# Patient Record
Sex: FEMALE | Race: WHITE | NOT HISPANIC OR LATINO | Employment: FULL TIME | URBAN - METROPOLITAN AREA
[De-identification: names, ages, dates, MRNs, and addresses within clinical notes are randomized per-mention and may not be internally consistent; named-entity substitution may affect disease eponyms.]

---

## 2021-05-30 ENCOUNTER — APPOINTMENT (OUTPATIENT)
Dept: ULTRASOUND IMAGING | Facility: HOSPITAL | Age: 31
End: 2021-05-30

## 2021-05-30 ENCOUNTER — HOSPITAL ENCOUNTER (EMERGENCY)
Facility: HOSPITAL | Age: 31
Discharge: HOME | End: 2021-05-30
Attending: EMERGENCY MEDICINE | Admitting: EMERGENCY MEDICINE

## 2021-05-30 VITALS
DIASTOLIC BLOOD PRESSURE: 83 MMHG | RESPIRATION RATE: 16 BRPM | OXYGEN SATURATION: 99 % | HEART RATE: 89 BPM | SYSTOLIC BLOOD PRESSURE: 125 MMHG | TEMPERATURE: 97.7 F

## 2021-05-30 DIAGNOSIS — O00.102 LEFT TUBAL PREGNANCY WITHOUT INTRAUTERINE PREGNANCY: Primary | ICD-10-CM

## 2021-05-30 LAB
ABO GROUP BLD: NORMAL
ALBUMIN SERPL-MCNC: 4.5 G/DL (ref 3.5–5.2)
ALBUMIN/GLOB SERPL: 1.3 G/DL
ALP SERPL-CCNC: 77 U/L (ref 39–117)
ALT SERPL W P-5'-P-CCNC: 16 U/L (ref 1–33)
ANION GAP SERPL CALCULATED.3IONS-SCNC: 11.1 MMOL/L (ref 5–15)
AST SERPL-CCNC: 22 U/L (ref 1–32)
BASOPHILS # BLD AUTO: 0.03 10*3/MM3 (ref 0–0.2)
BASOPHILS NFR BLD AUTO: 0.5 % (ref 0–1.5)
BILIRUB SERPL-MCNC: 0.3 MG/DL (ref 0–1.2)
BUN SERPL-MCNC: 10 MG/DL (ref 6–20)
BUN/CREAT SERPL: 12.3 (ref 7–25)
CALCIUM SPEC-SCNC: 9.3 MG/DL (ref 8.6–10.5)
CHLORIDE SERPL-SCNC: 105 MMOL/L (ref 98–107)
CO2 SERPL-SCNC: 21.9 MMOL/L (ref 22–29)
CREAT SERPL-MCNC: 0.81 MG/DL (ref 0.57–1)
DEPRECATED RDW RBC AUTO: 40.9 FL (ref 37–54)
EOSINOPHIL # BLD AUTO: 0.08 10*3/MM3 (ref 0–0.4)
EOSINOPHIL NFR BLD AUTO: 1.3 % (ref 0.3–6.2)
ERYTHROCYTE [DISTWIDTH] IN BLOOD BY AUTOMATED COUNT: 12.4 % (ref 12.3–15.4)
GFR SERPL CREATININE-BSD FRML MDRD: 101 ML/MIN/1.73
GFR SERPL CREATININE-BSD FRML MDRD: 84 ML/MIN/1.73
GLOBULIN UR ELPH-MCNC: 3.6 GM/DL
GLUCOSE SERPL-MCNC: 98 MG/DL (ref 65–99)
HCG INTACT+B SERPL-ACNC: 2043 MIU/ML
HCT VFR BLD AUTO: 40 % (ref 34–46.6)
HGB BLD-MCNC: 13.6 G/DL (ref 12–15.9)
IMM GRANULOCYTES # BLD AUTO: 0.01 10*3/MM3 (ref 0–0.05)
IMM GRANULOCYTES NFR BLD AUTO: 0.2 % (ref 0–0.5)
LYMPHOCYTES # BLD AUTO: 2.53 10*3/MM3 (ref 0.7–3.1)
LYMPHOCYTES NFR BLD AUTO: 39.9 % (ref 19.6–45.3)
MCH RBC QN AUTO: 30.8 PG (ref 26.6–33)
MCHC RBC AUTO-ENTMCNC: 34 G/DL (ref 31.5–35.7)
MCV RBC AUTO: 90.7 FL (ref 79–97)
MONOCYTES # BLD AUTO: 0.55 10*3/MM3 (ref 0.1–0.9)
MONOCYTES NFR BLD AUTO: 8.7 % (ref 5–12)
NEUTROPHILS NFR BLD AUTO: 3.14 10*3/MM3 (ref 1.7–7)
NEUTROPHILS NFR BLD AUTO: 49.4 % (ref 42.7–76)
NRBC BLD AUTO-RTO: 0 /100 WBC (ref 0–0.2)
PLATELET # BLD AUTO: 276 10*3/MM3 (ref 140–450)
PMV BLD AUTO: 10.8 FL (ref 6–12)
POTASSIUM SERPL-SCNC: 3.6 MMOL/L (ref 3.5–5.2)
PROT SERPL-MCNC: 8.1 G/DL (ref 6–8.5)
RBC # BLD AUTO: 4.41 10*6/MM3 (ref 3.77–5.28)
RH BLD: POSITIVE
SODIUM SERPL-SCNC: 138 MMOL/L (ref 136–145)
WBC # BLD AUTO: 6.34 10*3/MM3 (ref 3.4–10.8)

## 2021-05-30 PROCEDURE — 84702 CHORIONIC GONADOTROPIN TEST: CPT | Performed by: EMERGENCY MEDICINE

## 2021-05-30 PROCEDURE — 80053 COMPREHEN METABOLIC PANEL: CPT | Performed by: EMERGENCY MEDICINE

## 2021-05-30 PROCEDURE — 76815 OB US LIMITED FETUS(S): CPT

## 2021-05-30 PROCEDURE — 99283 EMERGENCY DEPT VISIT LOW MDM: CPT

## 2021-05-30 PROCEDURE — 86901 BLOOD TYPING SEROLOGIC RH(D): CPT | Performed by: EMERGENCY MEDICINE

## 2021-05-30 PROCEDURE — 93976 VASCULAR STUDY: CPT

## 2021-05-30 PROCEDURE — 86900 BLOOD TYPING SEROLOGIC ABO: CPT | Performed by: EMERGENCY MEDICINE

## 2021-05-30 PROCEDURE — 85025 COMPLETE CBC W/AUTO DIFF WBC: CPT | Performed by: EMERGENCY MEDICINE

## 2021-05-30 PROCEDURE — 76817 TRANSVAGINAL US OBSTETRIC: CPT

## 2021-05-30 RX ORDER — SODIUM CHLORIDE 0.9 % (FLUSH) 0.9 %
10 SYRINGE (ML) INJECTION AS NEEDED
Status: DISCONTINUED | OUTPATIENT
Start: 2021-05-30 | End: 2021-05-30 | Stop reason: HOSPADM

## 2022-02-02 LAB
EXTERNAL HEPATITIS B SURFACE ANTIGEN: NEGATIVE
EXTERNAL HEPATITIS C AB: NEGATIVE
EXTERNAL RUBELLA QUALITATIVE: NORMAL
EXTERNAL SYPHILIS RPR SCREEN: NORMAL
HIV1 P24 AG SERPL QL IA: NORMAL

## 2022-04-02 ENCOUNTER — HOSPITAL ENCOUNTER (EMERGENCY)
Facility: HOSPITAL | Age: 32
Discharge: HOME | End: 2022-04-03
Attending: EMERGENCY MEDICINE | Admitting: EMERGENCY MEDICINE

## 2022-04-02 DIAGNOSIS — Z3A.16 16 WEEKS GESTATION OF PREGNANCY: ICD-10-CM

## 2022-04-02 DIAGNOSIS — R10.31 RIGHT LOWER QUADRANT ABDOMINAL PAIN: Primary | ICD-10-CM

## 2022-04-02 LAB
ALBUMIN SERPL-MCNC: 4.1 G/DL (ref 3.5–5.2)
ALBUMIN/GLOB SERPL: 1.4 G/DL
ALP SERPL-CCNC: 95 U/L (ref 39–117)
ALT SERPL W P-5'-P-CCNC: 22 U/L (ref 1–33)
ANION GAP SERPL CALCULATED.3IONS-SCNC: 9 MMOL/L (ref 5–15)
AST SERPL-CCNC: 25 U/L (ref 1–32)
B-HCG UR QL: POSITIVE
BASOPHILS # BLD AUTO: 0.01 10*3/MM3 (ref 0–0.2)
BASOPHILS NFR BLD AUTO: 0.1 % (ref 0–1.5)
BILIRUB SERPL-MCNC: 0.2 MG/DL (ref 0–1.2)
BILIRUB UR QL STRIP: NEGATIVE
BUN SERPL-MCNC: 5 MG/DL (ref 6–20)
BUN/CREAT SERPL: 7.9 (ref 7–25)
CALCIUM SPEC-SCNC: 9.1 MG/DL (ref 8.6–10.5)
CHLORIDE SERPL-SCNC: 105 MMOL/L (ref 98–107)
CLARITY UR: CLEAR
CO2 SERPL-SCNC: 20 MMOL/L (ref 22–29)
COLOR UR: YELLOW
CREAT SERPL-MCNC: 0.63 MG/DL (ref 0.57–1)
DEPRECATED RDW RBC AUTO: 45.5 FL (ref 37–54)
EGFRCR SERPLBLD CKD-EPI 2021: 122.6 ML/MIN/1.73
EOSINOPHIL # BLD AUTO: 0.02 10*3/MM3 (ref 0–0.4)
EOSINOPHIL NFR BLD AUTO: 0.2 % (ref 0.3–6.2)
ERYTHROCYTE [DISTWIDTH] IN BLOOD BY AUTOMATED COUNT: 13.2 % (ref 12.3–15.4)
GLOBULIN UR ELPH-MCNC: 3 GM/DL
GLUCOSE SERPL-MCNC: 79 MG/DL (ref 65–99)
GLUCOSE UR STRIP-MCNC: NEGATIVE MG/DL
HCT VFR BLD AUTO: 38.4 % (ref 34–46.6)
HGB BLD-MCNC: 12.8 G/DL (ref 12–15.9)
HGB UR QL STRIP.AUTO: NEGATIVE
IMM GRANULOCYTES # BLD AUTO: 0.03 10*3/MM3 (ref 0–0.05)
IMM GRANULOCYTES NFR BLD AUTO: 0.4 % (ref 0–0.5)
KETONES UR QL STRIP: ABNORMAL
LEUKOCYTE ESTERASE UR QL STRIP.AUTO: NEGATIVE
LIPASE SERPL-CCNC: 23 U/L (ref 13–60)
LYMPHOCYTES # BLD AUTO: 1.45 10*3/MM3 (ref 0.7–3.1)
LYMPHOCYTES NFR BLD AUTO: 17.9 % (ref 19.6–45.3)
MCH RBC QN AUTO: 31.3 PG (ref 26.6–33)
MCHC RBC AUTO-ENTMCNC: 33.3 G/DL (ref 31.5–35.7)
MCV RBC AUTO: 93.9 FL (ref 79–97)
MONOCYTES # BLD AUTO: 0.51 10*3/MM3 (ref 0.1–0.9)
MONOCYTES NFR BLD AUTO: 6.3 % (ref 5–12)
NEUTROPHILS NFR BLD AUTO: 6.06 10*3/MM3 (ref 1.7–7)
NEUTROPHILS NFR BLD AUTO: 75.1 % (ref 42.7–76)
NITRITE UR QL STRIP: NEGATIVE
NRBC BLD AUTO-RTO: 0 /100 WBC (ref 0–0.2)
PH UR STRIP.AUTO: 5.5 [PH] (ref 5–8)
PLATELET # BLD AUTO: 258 10*3/MM3 (ref 140–450)
PMV BLD AUTO: 10.5 FL (ref 6–12)
POTASSIUM SERPL-SCNC: 3.5 MMOL/L (ref 3.5–5.2)
PROT SERPL-MCNC: 7.1 G/DL (ref 6–8.5)
PROT UR QL STRIP: NEGATIVE
RBC # BLD AUTO: 4.09 10*6/MM3 (ref 3.77–5.28)
SODIUM SERPL-SCNC: 134 MMOL/L (ref 136–145)
SP GR UR STRIP: 1.02 (ref 1–1.03)
UROBILINOGEN UR QL STRIP: ABNORMAL
WBC NRBC COR # BLD: 8.08 10*3/MM3 (ref 3.4–10.8)

## 2022-04-02 PROCEDURE — 36415 COLL VENOUS BLD VENIPUNCTURE: CPT

## 2022-04-02 PROCEDURE — 80053 COMPREHEN METABOLIC PANEL: CPT | Performed by: PHYSICIAN ASSISTANT

## 2022-04-02 PROCEDURE — 99284 EMERGENCY DEPT VISIT MOD MDM: CPT

## 2022-04-02 PROCEDURE — 81003 URINALYSIS AUTO W/O SCOPE: CPT | Performed by: PHYSICIAN ASSISTANT

## 2022-04-02 PROCEDURE — 85025 COMPLETE CBC W/AUTO DIFF WBC: CPT | Performed by: PHYSICIAN ASSISTANT

## 2022-04-02 PROCEDURE — 81025 URINE PREGNANCY TEST: CPT | Performed by: PHYSICIAN ASSISTANT

## 2022-04-02 PROCEDURE — 83690 ASSAY OF LIPASE: CPT | Performed by: PHYSICIAN ASSISTANT

## 2022-04-02 RX ORDER — PRENATAL VIT NO.126/IRON/FOLIC 28MG-0.8MG
TABLET ORAL DAILY
COMMUNITY

## 2022-04-02 RX ORDER — SODIUM CHLORIDE 0.9 % (FLUSH) 0.9 %
10 SYRINGE (ML) INJECTION AS NEEDED
Status: DISCONTINUED | OUTPATIENT
Start: 2022-04-02 | End: 2022-04-03 | Stop reason: HOSPADM

## 2022-04-02 RX ORDER — CETIRIZINE HYDROCHLORIDE 10 MG/1
10 TABLET ORAL DAILY
COMMUNITY

## 2022-04-02 RX ORDER — LEVOTHYROXINE SODIUM 0.05 MG/1
50 TABLET ORAL
COMMUNITY

## 2022-04-03 VITALS
HEIGHT: 66 IN | SYSTOLIC BLOOD PRESSURE: 113 MMHG | WEIGHT: 168 LBS | TEMPERATURE: 97.4 F | HEART RATE: 91 BPM | BODY MASS INDEX: 27 KG/M2 | RESPIRATION RATE: 16 BRPM | OXYGEN SATURATION: 98 % | DIASTOLIC BLOOD PRESSURE: 75 MMHG

## 2022-04-03 NOTE — ED NOTES
Pt c/o periumbilical cramping that changed to RLQ pain to entire abdominal pain.  Rates pain 7:10.

## 2022-04-03 NOTE — ED PROVIDER NOTES
EMERGENCY DEPARTMENT ENCOUNTER    Room Number:    Date of encounter:  4/3/2022  PCP: Dante Bingham MD  Historian: Patient      HPI:  Chief Complaint: Abdominal pain   A complete HPI/ROS/PMH/PSH/SH/FH are unobtainable due to: N/A    Context: Reed Hoff Dr. is a 31 y.o. female who is 16 weeks pregnant, [id number], who presents to the ED c/o abdominal pain.  Patient states that around 1800 tonight she developed periumbilical pain that has since migrated to the right lower quadrant.  She describes the pain as achy and sharp at times.  It is been associated with nausea, loss of appetite, some diarrhea, as well as some belching.  Denies any vomiting, fevers, chills, sweats, cramping, vaginal bleeding, or any UTI symptoms.      PAST MEDICAL HISTORY  Active Ambulatory Problems     Diagnosis Date Noted   • No Active Ambulatory Problems     Resolved Ambulatory Problems     Diagnosis Date Noted   • No Resolved Ambulatory Problems     Past Medical History:   Diagnosis Date   • Disease of thyroid gland          PAST SURGICAL HISTORY  Past Surgical History:   Procedure Laterality Date   • DENTAL PROCEDURE           FAMILY HISTORY  History reviewed. No pertinent family history.      SOCIAL HISTORY  Social History     Socioeconomic History   • Marital status:    Tobacco Use   • Smoking status: Never Smoker   Vaping Use   • Vaping Use: Never used   Substance and Sexual Activity   • Alcohol use: Not Currently   • Drug use: Never   • Sexual activity: Defer         ALLERGIES  Patient has no allergy information on record.        REVIEW OF SYSTEMS  Review of Systems     All systems reviewed and negative except for those discussed in HPI.       PHYSICAL EXAM    I have reviewed the triage vital signs and nursing notes.    ED Triage Vitals   Temp Heart Rate Resp BP SpO2   22 2142 22 2142 22 21422 21422   97.4 °F (36.3 °C) (!) 125 16 (!) 151/101 98 %      Temp src Heart Rate  Source Patient Position BP Location FiO2 (%)   -- 04/02/22 2213 04/02/22 2213 04/02/22 2213 --    Monitor Lying Right arm        Physical Exam  GENERAL: Alert and oriented x3, not distressed  HENT: mask in place  EYES: no scleral icterus, PERRL, EOMI  CV: regular rhythm, tachycardic  RESPIRATORY: normal effort  ABDOMEN: soft, mild RLQ tenderness without rebound or guarding, normal bowel sounds  MUSCULOSKELETAL: no deformity  NEURO: alert, moves all extremities, follows commands  SKIN: warm, dry, no rashes      LAB RESULTS  Recent Results (from the past 24 hour(s))   Urinalysis With Microscopic If Indicated (No Culture) - Urine, Clean Catch    Collection Time: 04/02/22 10:53 PM    Specimen: Urine, Clean Catch   Result Value Ref Range    Color, UA Yellow Yellow, Straw    Appearance, UA Clear Clear    pH, UA 5.5 5.0 - 8.0    Specific Gravity, UA 1.016 1.005 - 1.030    Glucose, UA Negative Negative    Ketones, UA Trace (A) Negative    Bilirubin, UA Negative Negative    Blood, UA Negative Negative    Protein, UA Negative Negative    Leuk Esterase, UA Negative Negative    Nitrite, UA Negative Negative    Urobilinogen, UA 0.2 E.U./dL 0.2 - 1.0 E.U./dL   Pregnancy, Urine - Urine, Clean Catch    Collection Time: 04/02/22 10:53 PM    Specimen: Urine, Clean Catch   Result Value Ref Range    HCG, Urine QL Positive (A) Negative   Comprehensive Metabolic Panel    Collection Time: 04/02/22 11:00 PM    Specimen: Blood   Result Value Ref Range    Glucose 79 65 - 99 mg/dL    BUN 5 (L) 6 - 20 mg/dL    Creatinine 0.63 0.57 - 1.00 mg/dL    Sodium 134 (L) 136 - 145 mmol/L    Potassium 3.5 3.5 - 5.2 mmol/L    Chloride 105 98 - 107 mmol/L    CO2 20.0 (L) 22.0 - 29.0 mmol/L    Calcium 9.1 8.6 - 10.5 mg/dL    Total Protein 7.1 6.0 - 8.5 g/dL    Albumin 4.10 3.50 - 5.20 g/dL    ALT (SGPT) 22 1 - 33 U/L    AST (SGOT) 25 1 - 32 U/L    Alkaline Phosphatase 95 39 - 117 U/L    Total Bilirubin 0.2 0.0 - 1.2 mg/dL    Globulin 3.0 gm/dL    A/G Ratio  1.4 g/dL    BUN/Creatinine Ratio 7.9 7.0 - 25.0    Anion Gap 9.0 5.0 - 15.0 mmol/L    eGFR 122.6 >60.0 mL/min/1.73   Lipase    Collection Time: 04/02/22 11:00 PM    Specimen: Blood   Result Value Ref Range    Lipase 23 13 - 60 U/L   CBC Auto Differential    Collection Time: 04/02/22 11:00 PM    Specimen: Blood   Result Value Ref Range    WBC 8.08 3.40 - 10.80 10*3/mm3    RBC 4.09 3.77 - 5.28 10*6/mm3    Hemoglobin 12.8 12.0 - 15.9 g/dL    Hematocrit 38.4 34.0 - 46.6 %    MCV 93.9 79.0 - 97.0 fL    MCH 31.3 26.6 - 33.0 pg    MCHC 33.3 31.5 - 35.7 g/dL    RDW 13.2 12.3 - 15.4 %    RDW-SD 45.5 37.0 - 54.0 fl    MPV 10.5 6.0 - 12.0 fL    Platelets 258 140 - 450 10*3/mm3    Neutrophil % 75.1 42.7 - 76.0 %    Lymphocyte % 17.9 (L) 19.6 - 45.3 %    Monocyte % 6.3 5.0 - 12.0 %    Eosinophil % 0.2 (L) 0.3 - 6.2 %    Basophil % 0.1 0.0 - 1.5 %    Immature Grans % 0.4 0.0 - 0.5 %    Neutrophils, Absolute 6.06 1.70 - 7.00 10*3/mm3    Lymphocytes, Absolute 1.45 0.70 - 3.10 10*3/mm3    Monocytes, Absolute 0.51 0.10 - 0.90 10*3/mm3    Eosinophils, Absolute 0.02 0.00 - 0.40 10*3/mm3    Basophils, Absolute 0.01 0.00 - 0.20 10*3/mm3    Immature Grans, Absolute 0.03 0.00 - 0.05 10*3/mm3    nRBC 0.0 0.0 - 0.2 /100 WBC       Ordered the above labs and independently reviewed the results.        RADIOLOGY  No Radiology Exams Resulted Within Past 24 Hours    I ordered the above noted radiological studies. Reviewed by me and discussed with radiologist.  See dictation for official radiology interpretation.      PROCEDURES    Procedures      MEDICATIONS GIVEN IN ER    Medications   sodium chloride 0.9 % flush 10 mL (has no administration in time range)         PROGRESS, DATA ANALYSIS, CONSULTS, AND MEDICAL DECISION MAKING    All labs have been independently reviewed by me.  All radiology studies have been reviewed by me and discussed with radiologist dictating the report.   EKG's independently viewed and interpreted by me.  Discussion below  represents my analysis of pertinent findings related to patient's condition, differential diagnosis, treatment plan and final disposition.    DDx: Includes but not limited to abdominal pain during pregnancy, round ligament pain, UTI, appendicitis, cholelithiasis    ED Course as of 04/03/22 0042   Sat Apr 02, 2022   2255 Fetal heart tones at 142 bpm. [GEN]   2313 WBC: 8.08 [GEN]   2313 Hemoglobin: 12.8 [GEN]   2313 Hematocrit: 38.4 [GEN]   2313 Platelets: 258 [GEN]   2313 Leukocytes, UA: Negative [GEN]   2313 Nitrite, UA: Negative [GEN]   2313 HCG, Urine QL(!): Positive [GEN]   2333 Glucose: 79 [GEN]   2334 BUN(!): 5 [GEN]   2334 Creatinine: 0.63 [GEN]   2334 Sodium(!): 134 [GEN]   2334 Potassium: 3.5 [GEN]   2334 Chloride: 105 [GEN]   2334 CO2(!): 20.0 [GEN]   2334 Lipase: 23 [GEN]   Sun Apr 03, 2022   0012 Patient rechecked, sitting comfortably in bed.  Patient states that her symptoms have improved and her pain is currently almost resolved.  Discussed her results including a normal WBC count and urinalysis.  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  Patient was given strict return to ER precautions, she expresses understanding and agrees this plan. [GEN]      ED Course User Index  [GEN] Lewis Thibodeaux PA       MDM: Patient's pain is improved, WBC count is normal, and there is no evidence for a urinary tract affection.  Fetal heart tones were heard at 142 bpm and are normal.  Shared decision making with patient regarding observation versus discharge home, patient has decided to go home and monitor her symptoms and return if anything should change or worse.  Patient was given strict return to ER precautions, she expresses understanding and agrees with plan.    PPE: The patient wore a surgical mask throughout the entire  patient encounter. I wore an N95.    AS OF 00:42 EDT VITALS:    BP - 113/75  HR - 91  TEMP - 97.4 °F (36.3 °C)  O2 SATS - 98%        DIAGNOSIS  Final diagnoses:   Right lower quadrant abdominal pain   16 weeks gestation of pregnancy         DISPOSITION  DISCHARGE    Patient discharged in stable condition.    Reviewed implications of results, diagnosis, meds, responsibility to follow up, warning signs and symptoms of possible worsening, potential complications and reasons to return to ER.    Patient/Family voiced understanding of above instructions.    Discussed plan for discharge, as there is no emergent indication for admission. Patient referred to primary care provider for BP management due to today's BP. Pt/family is agreeable and understands need for follow up and repeat testing.  Pt is aware that discharge does not mean that nothing is wrong but it indicates no emergency is present that requires admission and they must continue care with follow-up as given below or physician of their choice.     FOLLOW-UP  Madie Adames MD  [address] [id number] [phone]      As scheduled         Medication List      No changes were made to your prescriptions during this visit.                    Lewis Thibodeaux PA  04/03/22 0042

## 2022-04-03 NOTE — ED TRIAGE NOTES
Pt arrived ambulatory via private vehicle c/o of cramping that starts at her umbilicus and radiates to her RLQ. Pt states she is nauseous but denies vomiting. Pt denies bleeding and headache. Pt is a patient of Dr. Burns     Pt wearing mask, triage personnel wearing appropriate PPE.

## 2022-04-03 NOTE — ED NOTES
At approx 2136 when pt arrived LD hosptialist was paged and writer was told to page Dr. Adames as she is the pts OBGYN. Dr Adames was called at 2138 and stated she wanted the pt to be seen in the main ER. Pt denies bleeding and headache

## 2022-04-03 NOTE — DISCHARGE INSTRUCTIONS
Home, rest, Tylenol as needed for pain, keep well-hydrated.  Home medicine as prescribed, follow up with OB as scheduled for recheck. Return to care if symptoms worsen or with further concerns.

## 2022-04-05 NOTE — ED PROVIDER NOTES
MD ATTESTATION NOTE    The JANY and I have discussed this patient's history, physical exam, and treatment plan.    I provided a substantive portion of the care of this patient. I personally performed the physical exam, in its entirety. The attached note describes my personal findings.      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  16 weeks pregnant.  She developed periumbilical pain that migrated to the right lower quadrant.  She states her pain is much improved at this time.  No fever, vomiting, diarrhea, urinary symptoms.      On exam:  GENERAL: not distressed  HENT: nares patent  EYES: no scleral icterus  CV: regular rhythm, regular rate  RESPIRATORY: normal effort  ABDOMEN: soft, nontender  MUSCULOSKELETAL: no deformity  NEURO: alert, moves all extremities, follows commands  SKIN: warm, dry    Labs  No results found for this or any previous visit (from the past 24 hour(s)).    Radiology  No Radiology Exams Resulted Within Past 24 Hours    Medical Decision Making:  ED Course as of 04/05/22 1626   Sat Apr 02, 2022   2255 Fetal heart tones at 142 bpm. [GEN]   2313 WBC: 8.08 [GEN]   2313 Hemoglobin: 12.8 [GEN]   2313 Hematocrit: 38.4 [GEN]   2313 Platelets: 258 [GEN]   2313 Leukocytes, UA: Negative [GEN]   2313 Nitrite, UA: Negative [GEN]   2313 HCG, Urine QL(!): Positive [GEN]   2333 Glucose: 79 [GEN]   2334 BUN(!): 5 [GEN]   2334 Creatinine: 0.63 [GEN]   2334 Sodium(!): 134 [GEN]   2334 Potassium: 3.5 [GEN]   2334 Chloride: 105 [GEN]   2334 CO2(!): 20.0 [GEN]   2334 Lipase: 23 [GEN]   Sun Apr 03, 2022   0012 Patient rechecked, sitting comfortably in bed.  Patient states that her symptoms have improved and her pain is currently almost resolved.  Discussed her results including a normal WBC count and urinalysis.  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  Patient was given strict return to ER precautions, she expresses understanding and agrees this plan. [GEN]      ED Course User Index  [GEN] Lewis Thibodeaux PA       Patient has a very benign abdominal exam.  No leukocytosis.  I have rather low suspicion for appendicitis.  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  She ultimately prefers to proceed on expected management course and is clinically very reasonable.  Discharge home.    PPE: Both the patient and I wore a surgical mask throughout the entire patient encounter. I wore protective goggles.     Diagnosis  Final diagnoses:   Right lower quadrant abdominal pain   16 weeks gestation of pregnancy        Louis Quevedo II, MD  04/05/22 4636

## 2022-08-23 LAB — EXTERNAL GROUP B STREP ANTIGEN: NEGATIVE

## 2022-09-12 ENCOUNTER — HOSPITAL ENCOUNTER (INPATIENT)
Facility: HOSPITAL | Age: 32
LOS: 2 days | Discharge: HOME | End: 2022-09-14
Attending: OBSTETRICS & GYNECOLOGY | Admitting: OBSTETRICS & GYNECOLOGY

## 2022-09-12 ENCOUNTER — ANESTHESIA EVENT (OUTPATIENT)
Dept: LABOR AND DELIVERY | Facility: HOSPITAL | Age: 32
End: 2022-09-12

## 2022-09-12 ENCOUNTER — HOSPITAL ENCOUNTER (INPATIENT)
Dept: LABOR AND DELIVERY | Facility: HOSPITAL | Age: 32
Discharge: HOME | End: 2022-09-12

## 2022-09-12 ENCOUNTER — ANESTHESIA (OUTPATIENT)
Dept: LABOR AND DELIVERY | Facility: HOSPITAL | Age: 32
End: 2022-09-12

## 2022-09-12 PROBLEM — E03.9 HYPOTHYROIDISM AFFECTING PREGNANCY: Status: ACTIVE | Noted: 2022-09-12

## 2022-09-12 PROBLEM — Z34.90 PREGNANCY: Status: ACTIVE | Noted: 2022-09-12

## 2022-09-12 PROBLEM — O99.280 HYPOTHYROIDISM AFFECTING PREGNANCY: Status: ACTIVE | Noted: 2022-09-12

## 2022-09-12 LAB
ABO GROUP BLD: NORMAL
BLD GP AB SCN SERPL QL: NEGATIVE
DEPRECATED RDW RBC AUTO: 39.6 FL (ref 37–54)
ERYTHROCYTE [DISTWIDTH] IN BLOOD BY AUTOMATED COUNT: 12.9 % (ref 12.3–15.4)
HCT VFR BLD AUTO: 31.2 % (ref 34–46.6)
HGB BLD-MCNC: 10.6 G/DL (ref 12–15.9)
MCH RBC QN AUTO: 28.7 PG (ref 26.6–33)
MCHC RBC AUTO-ENTMCNC: 34 G/DL (ref 31.5–35.7)
MCV RBC AUTO: 84.6 FL (ref 79–97)
PLATELET # BLD AUTO: 257 10*3/MM3 (ref 140–450)
PMV BLD AUTO: 11.6 FL (ref 6–12)
RBC # BLD AUTO: 3.69 10*6/MM3 (ref 3.77–5.28)
RH BLD: POSITIVE
T&S EXPIRATION DATE: NORMAL
WBC NRBC COR # BLD: 8.23 10*3/MM3 (ref 3.4–10.8)

## 2022-09-12 PROCEDURE — 3E033VJ INTRODUCTION OF OTHER HORMONE INTO PERIPHERAL VEIN, PERCUTANEOUS APPROACH: ICD-10-PCS | Performed by: OBSTETRICS & GYNECOLOGY

## 2022-09-12 PROCEDURE — 85027 COMPLETE CBC AUTOMATED: CPT | Performed by: OBSTETRICS & GYNECOLOGY

## 2022-09-12 PROCEDURE — 25010000002 OXYTOCIN PER 10 UNITS: Performed by: OBSTETRICS & GYNECOLOGY

## 2022-09-12 PROCEDURE — 86901 BLOOD TYPING SEROLOGIC RH(D): CPT | Performed by: OBSTETRICS & GYNECOLOGY

## 2022-09-12 PROCEDURE — C1755 CATHETER, INTRASPINAL: HCPCS | Performed by: ANESTHESIOLOGY

## 2022-09-12 PROCEDURE — 86850 RBC ANTIBODY SCREEN: CPT | Performed by: OBSTETRICS & GYNECOLOGY

## 2022-09-12 PROCEDURE — 86900 BLOOD TYPING SEROLOGIC ABO: CPT | Performed by: OBSTETRICS & GYNECOLOGY

## 2022-09-12 RX ORDER — ONDANSETRON 4 MG/1
4 TABLET, FILM COATED ORAL EVERY 6 HOURS PRN
Status: DISCONTINUED | OUTPATIENT
Start: 2022-09-12 | End: 2022-09-13 | Stop reason: HOSPADM

## 2022-09-12 RX ORDER — FAMOTIDINE 20 MG/1
20 TABLET, FILM COATED ORAL EVERY 12 HOURS SCHEDULED
Status: DISCONTINUED | OUTPATIENT
Start: 2022-09-12 | End: 2022-09-13 | Stop reason: HOSPADM

## 2022-09-12 RX ORDER — FAMOTIDINE 10 MG/ML
20 INJECTION, SOLUTION INTRAVENOUS EVERY 12 HOURS SCHEDULED
Status: DISCONTINUED | OUTPATIENT
Start: 2022-09-12 | End: 2022-09-13 | Stop reason: HOSPADM

## 2022-09-12 RX ORDER — DIPHENHYDRAMINE HYDROCHLORIDE 50 MG/ML
12.5 INJECTION INTRAMUSCULAR; INTRAVENOUS EVERY 8 HOURS PRN
Status: DISCONTINUED | OUTPATIENT
Start: 2022-09-12 | End: 2022-09-13 | Stop reason: HOSPADM

## 2022-09-12 RX ORDER — FAMOTIDINE 10 MG/ML
20 INJECTION, SOLUTION INTRAVENOUS ONCE AS NEEDED
Status: DISCONTINUED | OUTPATIENT
Start: 2022-09-12 | End: 2022-09-13 | Stop reason: HOSPADM

## 2022-09-12 RX ORDER — FENTANYL CIT 0.2 MG/100ML-ROPIV 0.2%-NACL 0.9% EPIDURAL INJ 2/0.2 MCG/ML-%
10 SOLUTION INJECTION CONTINUOUS
Status: DISCONTINUED | OUTPATIENT
Start: 2022-09-12 | End: 2022-09-13

## 2022-09-12 RX ORDER — MAGNESIUM CARB/ALUMINUM HYDROX 105-160MG
30 TABLET,CHEWABLE ORAL ONCE
Status: DISCONTINUED | OUTPATIENT
Start: 2022-09-12 | End: 2022-09-13 | Stop reason: HOSPADM

## 2022-09-12 RX ORDER — ACETAMINOPHEN 325 MG/1
650 TABLET ORAL EVERY 4 HOURS PRN
Status: DISCONTINUED | OUTPATIENT
Start: 2022-09-12 | End: 2022-09-13 | Stop reason: HOSPADM

## 2022-09-12 RX ORDER — OXYTOCIN-SODIUM CHLORIDE 0.9% IV SOLN 30 UNIT/500ML 30-0.9/5 UT/ML-%
999 SOLUTION INTRAVENOUS ONCE
Status: COMPLETED | OUTPATIENT
Start: 2022-09-12 | End: 2022-09-13

## 2022-09-12 RX ORDER — OXYTOCIN-SODIUM CHLORIDE 0.9% IV SOLN 30 UNIT/500ML 30-0.9/5 UT/ML-%
2-20 SOLUTION INTRAVENOUS
Status: DISCONTINUED | OUTPATIENT
Start: 2022-09-12 | End: 2022-09-13 | Stop reason: HOSPADM

## 2022-09-12 RX ORDER — OXYTOCIN-SODIUM CHLORIDE 0.9% IV SOLN 30 UNIT/500ML 30-0.9/5 UT/ML-%
250 SOLUTION INTRAVENOUS CONTINUOUS PRN
Status: ACTIVE | OUTPATIENT
Start: 2022-09-12 | End: 2022-09-12

## 2022-09-12 RX ORDER — ONDANSETRON 2 MG/ML
4 INJECTION INTRAMUSCULAR; INTRAVENOUS EVERY 6 HOURS PRN
Status: DISCONTINUED | OUTPATIENT
Start: 2022-09-12 | End: 2022-09-13 | Stop reason: HOSPADM

## 2022-09-12 RX ORDER — LIDOCAINE HYDROCHLORIDE AND EPINEPHRINE 15; 5 MG/ML; UG/ML
INJECTION, SOLUTION EPIDURAL AS NEEDED
Status: DISCONTINUED | OUTPATIENT
Start: 2022-09-12 | End: 2022-09-12 | Stop reason: SURG

## 2022-09-12 RX ORDER — SODIUM CHLORIDE 0.9 % (FLUSH) 0.9 %
10 SYRINGE (ML) INJECTION AS NEEDED
Status: DISCONTINUED | OUTPATIENT
Start: 2022-09-12 | End: 2022-09-13 | Stop reason: HOSPADM

## 2022-09-12 RX ORDER — ONDANSETRON 2 MG/ML
4 INJECTION INTRAMUSCULAR; INTRAVENOUS ONCE AS NEEDED
Status: DISCONTINUED | OUTPATIENT
Start: 2022-09-12 | End: 2022-09-13 | Stop reason: HOSPADM

## 2022-09-12 RX ORDER — EPHEDRINE SULFATE 50 MG/ML
5 INJECTION, SOLUTION INTRAVENOUS AS NEEDED
Status: DISCONTINUED | OUTPATIENT
Start: 2022-09-12 | End: 2022-09-13 | Stop reason: HOSPADM

## 2022-09-12 RX ORDER — SODIUM CHLORIDE, SODIUM LACTATE, POTASSIUM CHLORIDE, CALCIUM CHLORIDE 600; 310; 30; 20 MG/100ML; MG/100ML; MG/100ML; MG/100ML
125 INJECTION, SOLUTION INTRAVENOUS CONTINUOUS
Status: DISCONTINUED | OUTPATIENT
Start: 2022-09-12 | End: 2022-09-13

## 2022-09-12 RX ORDER — TERBUTALINE SULFATE 1 MG/ML
0.25 INJECTION, SOLUTION SUBCUTANEOUS AS NEEDED
Status: DISCONTINUED | OUTPATIENT
Start: 2022-09-12 | End: 2022-09-13 | Stop reason: HOSPADM

## 2022-09-12 RX ORDER — LIDOCAINE HYDROCHLORIDE 15 MG/ML
INJECTION, SOLUTION EPIDURAL; INFILTRATION; INTRACAUDAL; PERINEURAL AS NEEDED
Status: DISCONTINUED | OUTPATIENT
Start: 2022-09-12 | End: 2022-09-12 | Stop reason: SURG

## 2022-09-12 RX ADMIN — Medication 10 ML/HR: at 13:35

## 2022-09-12 RX ADMIN — SODIUM CHLORIDE, POTASSIUM CHLORIDE, SODIUM LACTATE AND CALCIUM CHLORIDE 1000 ML: 600; 310; 30; 20 INJECTION, SOLUTION INTRAVENOUS at 12:30

## 2022-09-12 RX ADMIN — OXYTOCIN 2 MILLI-UNITS/MIN: 10 INJECTION INTRAVENOUS at 08:50

## 2022-09-12 RX ADMIN — LIDOCAINE HYDROCHLORIDE AND EPINEPHRINE 3 ML: 15; 5 INJECTION, SOLUTION EPIDURAL at 13:28

## 2022-09-12 RX ADMIN — LIDOCAINE HYDROCHLORIDE AND EPINEPHRINE 2 ML: 15; 5 INJECTION, SOLUTION EPIDURAL at 13:31

## 2022-09-12 RX ADMIN — LIDOCAINE HYDROCHLORIDE 4 ML: 15 INJECTION, SOLUTION EPIDURAL; INFILTRATION; INTRACAUDAL; PERINEURAL at 13:16

## 2022-09-12 RX ADMIN — SODIUM CHLORIDE, POTASSIUM CHLORIDE, SODIUM LACTATE AND CALCIUM CHLORIDE 125 ML/HR: 600; 310; 30; 20 INJECTION, SOLUTION INTRAVENOUS at 16:46

## 2022-09-12 RX ADMIN — SODIUM CHLORIDE, POTASSIUM CHLORIDE, SODIUM LACTATE AND CALCIUM CHLORIDE 125 ML/HR: 600; 310; 30; 20 INJECTION, SOLUTION INTRAVENOUS at 07:55

## 2022-09-12 NOTE — PROGRESS NOTES
[facility]  Obstetric History and Physical    Chief Complaint   Patient presents with   • Scheduled Induction     +FM -LOF       Subjective     Patient is a 31 y.o. female  currently at 39w2d, who presents for elective term induction. She is doing well today. She endorses perceiving intermittent contractions. Denies VB, LOF. Endorses good FM.     Her prenatal care is complicated by:  - abnormal 1hr gtt (nml 3hr)  - hypothyroidism (on Synthroid).      The following portions of the patients history were reviewed and updated as appropriate: current medications, past medical history, past surgical history, past family history and past social history .       Prenatal Information:  Prenatal Results     POC Urine Glucose/Protein     Test Value Reference Range Date Time    Urine Glucose        Urine Protein              Initial Prenatal Labs     Test Value Reference Range Date Time    Hemoglobin  12.8 g/dL 12.0 - 15.9 22 2300    Hematocrit  38.4 % 34.0 - 46.6 22 2300    Platelets  258 10*3/mm3 140 - 450 22 2300    Rubella IgG ^ Immune   22     Hepatitis B SAg ^ Negative   22     Hepatitis C Ab ^ Negative   22     RPR ^ Non-Reactive   22     ABO  O   22 0758    Rh  Positive   22 0758    Antibody Screen        HIV ^ Non-Reactive   22     Urine Culture        Gonorrhea        Chlamydia        TSH        HgB A1c               2nd and 3rd Trimester     Test Value Reference Range Date Time    Hemoglobin (repeated)  10.6 g/dL 12.0 - 15.9 22 0758    Hematocrit (repeated)  31.2 % 34.0 - 46.6 22 0758    Platelets   257 10*3/mm3 140 - 450 22 0758       258 10*3/mm3 140 - 450 22 2300    GCT        Antibody Screen (repeated)  Negative   22 0758    GTT Fasting        GTT 1 Hr        GTT 2 Hr        GTT 3 Hr        Group B Strep ^ Negative   22           Drug Screening     Test Value Reference Range Date Time    Amphetamine Screen         Barbiturate Screen        Benzodiazepine Screen        Methadone Screen        Phencyclidine Screen        Opiates Screen        THC Screen        Cocaine Screen        Propoxyphene Screen        Buprenorphine Screen        Methamphetamine Screen        Oxycodone Screen        Tricyclic Antidepressants Screen              Other (Risk screening)     Test Value Reference Range Date Time    Varicella IgG        Parvovirus IgG        CMV IgG        Cystic Fibrosis        Hemoglobin electrophoresis        NIPT        MSAFP-4        AFP (for NTD only)              Legend    ^: Historical                      External Prenatal Results     Pregnancy Outside Results - Transcribed From Office Records - See Scanned Records For Details     Test Value Date Time    ABO  O  09/12/22 0758    Rh  Positive  09/12/22 0758    Antibody Screen  Negative  09/12/22 0758    Varicella IgG       Rubella ^ Immune  02/02/22     Hgb  10.6 g/dL 09/12/22 0758       12.8 g/dL 04/02/22 2300    Hct  31.2 % 09/12/22 0758       38.4 % 04/02/22 2300    Glucose Fasting GTT       Glucose Tolerance Test 1 hour       Glucose Tolerance Test 3 hour       Gonorrhea (discrete)       Chlamydia (discrete)       RPR ^ Non-Reactive  02/02/22     VDRL       Syphilis Antibody       HBsAg ^ Negative  02/02/22     Herpes Simplex Virus PCR       Herpes Simplex VIrus Culture       HIV ^ Non-Reactive  02/02/22     Hep C RNA Quant PCR       Hep C Antibody ^ Negative  02/02/22     AFP       Group B Strep ^ Negative  08/23/22     GBS Susceptibility to Clindamycin       GBS Susceptibility to Erythromycin       Fetal Fibronectin       Genetic Testing, Maternal Blood             Drug Screening     Test Value Date Time    Urine Drug Screen       Amphetamine Screen       Barbiturate Screen       Benzodiazepine Screen       Methadone Screen       Phencyclidine Screen       Opiates Screen       THC Screen       Cocaine Screen       Propoxyphene Screen       Buprenorphine Screen        Methamphetamine Screen       Oxycodone Screen       Tricyclic Antidepressants Screen             Legend    ^: Historical                         Past OB History:     OB History    Para Term  AB Living   2 0 0 0 1 0   SAB IAB Ectopic Molar Multiple Live Births   0 0 0 0 0 0      # Outcome Date GA Lbr Casa/2nd Weight Sex Delivery Anes PTL Lv   2 Current            1 AB 21 5w0d              Past Medical History: Past Medical History:   Diagnosis Date   • Disease of thyroid gland       Past Surgical History Past Surgical History:   Procedure Laterality Date   • DENTAL PROCEDURE     • WISDOM TOOTH EXTRACTION Bilateral 2009      Family History: Family History   Problem Relation Age of Onset   • Cancer Mother    • Hashimoto's thyroiditis Mother    • Hypertension Father       Social History:  reports that she has never smoked. She does not have any smokeless tobacco history on file.   reports previous alcohol use.   reports no history of drug use.        Review of Systems   Constitutional: Negative for chills and fever.   Respiratory: Negative for cough and shortness of breath.    Cardiovascular: Negative for chest pain and palpitations.   Gastrointestinal: Negative for abdominal pain, constipation, diarrhea, nausea and vomiting.   Genitourinary: Negative for dysuria.         Objective     Vital Signs Range for the last 24 hours  Temperature: Temp:  [98.4 °F (36.9 °C)-98.7 °F (37.1 °C)] 98.4 °F (36.9 °C)   Temp Source: Temp src: Axillary   BP: BP: ()/(57-92) 111/69   Pulse: Heart Rate:  [] 92   Respirations: Resp:  [16] 16   SPO2: SpO2:  [98 %-100 %] 99 %   O2 Amount (l/min):     O2 Devices Device (Oxygen Therapy): room air   Weight: Weight:  [90.7 kg (200 lb)] 90.7 kg (200 lb)     Physical Examination:   Physical Exam  Vitals reviewed.   Constitutional:       General: She is not in acute distress.     Appearance: Normal appearance.   HENT:      Head: Normocephalic and atraumatic.   Eyes:       Extraocular Movements: Extraocular movements intact.      Pupils: Pupils are equal, round, and reactive to light.   Cardiovascular:      Rate and Rhythm: Normal rate and regular rhythm.      Pulses: Normal pulses.      Heart sounds: Normal heart sounds.   Pulmonary:      Effort: Pulmonary effort is normal.      Breath sounds: Normal breath sounds.   Abdominal:      Tenderness: There is no abdominal tenderness. There is no guarding.      Comments: Gravid; Fundal height appropriate for GA   Skin:     General: Skin is warm and dry.   Neurological:      General: No focal deficit present.      Mental Status: She is alert and oriented to person, place, and time.   Psychiatric:         Mood and Affect: Mood normal.         Presentation: Cephalic- Leopold's    Cervix: Exam by:     Dilation: Cervical Dilation (cm): 1   Effacement: Cervical Effacement: 60%   Station: Fetal Station: 1-->-2     Fetal Heart Rate Assessment   Method: Fetal HR Assessment Method: external   Beats/min: Fetal HR (beats/min): 135   Baseline: Fetal HR Baseline: normal range   Variability: Fetal HR Variability: moderate (amplitude range 6 to 25 bpm)   Accels: Fetal HR Accelerations: greater than/equal to 15 bpm, lasting at least 15 seconds   Decels: Fetal HR Decelerations: absent   Tracing Category:       Uterine Assessment   Method: Method: palpation, external tocotransducer   Frequency (min): Contraction Frequency (Minutes): 2-4   Ctx Count in 10 min: Contractions in 10 Minutes: 3-4   Duration:     Intensity: Contraction Intensity: moderate by palpation   Intensity by IUPC:     Resting Tone: Uterine Resting Tone: soft by palpation   Resting Tone by IUPC:     [place] Units:           Assessment & Plan       Pregnancy    Hypothyroidism affecting pregnancy    Assessment:  1.  Intrauterine pregnancy at 39w2d gestation with reactive fetal status.    2.  GBS status:   External Strep Group B Ag   Date Value Ref Range Status   08/23/2022 Negative   Final       Plan:  1. fetal and uterine monitoring  continuously and labor augmentation  Pitocin  2. Plan of care has been reviewed with patient and family  3.  Risks, benefits of treatment plan have been discussed.  4.  All questions have been answered.        Sophie Morgan MD  9/12/2022  16:33 EDT

## 2022-09-12 NOTE — ANESTHESIA PROCEDURE NOTES
Labor Epidural      Patient reassessed immediately prior to procedure    Patient location during procedure: OB  Performed By  Anesthesiologist: Priscilla Mei MD  Preanesthetic Checklist  Completed: patient identified, IV checked, site marked, risks and benefits discussed, surgical consent, monitors and equipment checked, pre-op evaluation and timeout performed  Prep:  Pt Position:sitting  Sterile Tech:cap, gloves, mask and sterile barrier  Prep:chlorhexidine gluconate and isopropyl alcohol  Monitoring:blood pressure monitoring and EKG  Epidural Block Procedure:  Approach:midline  Guidance:landmark technique  Location:L4-L5  Needle Type:Tuohy  Needle Gauge:17  Loss of Resistance Medium: saline  Cath Depth at skin:12 cm  Paresthesia: none  Aspiration:negative  Test Dose:negative  Number of Attempts: 1  Post Assessment:  Dressing:occlusive dressing applied and secured with tape  Pt Tolerance:patient tolerated the procedure well with no apparent complications  Complications:no

## 2022-09-12 NOTE — H&P
[facility]  Obstetric History and Physical    Patient Name: Reed Xavier  :  1990  MRN:  [id number]      Chief Complaint   Patient presents with   • Scheduled Induction     +FM -LOF       Subjective     Patient is a 31 y.o. female  currently at 39w2d, who presents for induction secondary to patient desire.  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  Her pnc has been c/b hypothyroid.  gbs neg... panorama neg.. last efw was 57%      Objective       Vital Signs Range for the last 24 hours  Temperature: Temp:  [98.4 °F (36.9 °C)-98.7 °F (37.1 °C)] 98.4 °F (36.9 °C)   Temp Source: Temp src: Axillary   BP: BP: (116-137)/(69-92) 127/85   Pulse: Heart Rate:  [] 84   Respirations: Resp:  [16] 16                   Physical Examination:     General :  Alert in NAD  Abdomen: Gravid, EFW 8pds by leopolds    Presentation: [facility]   Cervix: Exam by: Method: sterile exam per RN   Dilation: Cervical Dilation (cm): 1   Effacement: Cervical Effacement: 60%   Station: Fetal Station: -2       Fetal Heart Rate Assessment   Method: Fetal HR Assessment Method: external   Beats/min: Fetal HR (beats/min): 135   Baseline: Fetal HR Baseline: normal range   Varibility: Fetal HR Variability: moderate (amplitude range 6 to 25 bpm) (period of minimal)   Accels: Fetal HR Accelerations: greater than/equal to 15 bpm, lasting at least 15 seconds   Decels: Fetal HR Decelerations: absent   Tracing Category:       Uterine Assessment   Method: Method: palpation, external tocotransducer   Frequency (min): Contraction Frequency (Minutes): 3-4   Ctx Count in 10 min:     Duration:     Intensity: Contraction Intensity: mild by palpation   Intensity by IUPC:     Resting Tone: Uterine Resting Tone: soft by palpation   Resting Tone by IUPC:     [place] Units:           Results from last 7 days   Lab Units 22  0758   WBC 10*3/mm3 8.23   HEMOGLOBIN g/dL 10.6*   HEMATOCRIT % 31.2*   PLATELETS 10*3/mm3 257       Assessment & Plan      1.  Intrauterine pregnancy at 39w2d weeks gestation with   reactive, reassuring fetal status.   Continue pitocin. Pt requests epidural prior to arom.  dw it may slow things down and she won't be mobile. She understands. Anticipate .  Plan of care has been reviewed with patient and family.  All questions answered.      Pregnancy    Hypothyroidism affecting pregnancy        H&P updated. The patient was examined and the following changes are noted above.         Reena Marsh MD  2022  12:50 EDT

## 2022-09-12 NOTE — ANESTHESIA PREPROCEDURE EVALUATION
Anesthesia Evaluation     Patient summary reviewed and Nursing notes reviewed                Airway   Mallampati: II  Dental - normal exam     Pulmonary - negative pulmonary ROS   Cardiovascular - negative cardio ROS        Neuro/Psych- negative ROS  GI/Hepatic/Renal/Endo    (+)   thyroid problem hypothyroidism    Musculoskeletal (-) negative ROS    Abdominal    Substance History - negative use     OB/GYN    (+) Pregnant,         Other - negative ROS                       Anesthesia Plan    ASA 2     epidural       Anesthetic plan, risks, benefits, and alternatives have been provided, discussed and informed consent has been obtained with: patient.        CODE STATUS:

## 2022-09-13 LAB
BASOPHILS # BLD AUTO: 0.01 10*3/MM3 (ref 0–0.2)
BASOPHILS NFR BLD AUTO: 0.1 % (ref 0–1.5)
DEPRECATED RDW RBC AUTO: 42.7 FL (ref 37–54)
EOSINOPHIL # BLD AUTO: 0 10*3/MM3 (ref 0–0.4)
EOSINOPHIL NFR BLD AUTO: 0 % (ref 0.3–6.2)
ERYTHROCYTE [DISTWIDTH] IN BLOOD BY AUTOMATED COUNT: 13.2 % (ref 12.3–15.4)
HCT VFR BLD AUTO: 27.5 % (ref 34–46.6)
HGB BLD-MCNC: 9 G/DL (ref 12–15.9)
IMM GRANULOCYTES # BLD AUTO: 0.09 10*3/MM3 (ref 0–0.05)
IMM GRANULOCYTES NFR BLD AUTO: 0.6 % (ref 0–0.5)
LYMPHOCYTES # BLD AUTO: 1.27 10*3/MM3 (ref 0.7–3.1)
LYMPHOCYTES NFR BLD AUTO: 8 % (ref 19.6–45.3)
MCH RBC QN AUTO: 28.9 PG (ref 26.6–33)
MCHC RBC AUTO-ENTMCNC: 32.7 G/DL (ref 31.5–35.7)
MCV RBC AUTO: 88.4 FL (ref 79–97)
MONOCYTES # BLD AUTO: 0.57 10*3/MM3 (ref 0.1–0.9)
MONOCYTES NFR BLD AUTO: 3.6 % (ref 5–12)
NEUTROPHILS NFR BLD AUTO: 13.87 10*3/MM3 (ref 1.7–7)
NEUTROPHILS NFR BLD AUTO: 87.7 % (ref 42.7–76)
NRBC BLD AUTO-RTO: 0 /100 WBC (ref 0–0.2)
PLATELET # BLD AUTO: 226 10*3/MM3 (ref 140–450)
PMV BLD AUTO: 11.9 FL (ref 6–12)
RBC # BLD AUTO: 3.11 10*6/MM3 (ref 3.77–5.28)
WBC NRBC COR # BLD: 15.81 10*3/MM3 (ref 3.4–10.8)

## 2022-09-13 PROCEDURE — 10907ZC DRAINAGE OF AMNIOTIC FLUID, THERAPEUTIC FROM PRODUCTS OF CONCEPTION, VIA NATURAL OR ARTIFICIAL OPENING: ICD-10-PCS | Performed by: OBSTETRICS & GYNECOLOGY

## 2022-09-13 PROCEDURE — 85025 COMPLETE CBC W/AUTO DIFF WBC: CPT | Performed by: OBSTETRICS & GYNECOLOGY

## 2022-09-13 PROCEDURE — 25010000002 OXYTOCIN PER 10 UNITS: Performed by: OBSTETRICS & GYNECOLOGY

## 2022-09-13 PROCEDURE — 0KQM0ZZ REPAIR PERINEUM MUSCLE, OPEN APPROACH: ICD-10-PCS | Performed by: OBSTETRICS & GYNECOLOGY

## 2022-09-13 RX ORDER — IBUPROFEN 800 MG/1
800 TABLET ORAL EVERY 8 HOURS PRN
Status: DISCONTINUED | OUTPATIENT
Start: 2022-09-13 | End: 2022-09-13

## 2022-09-13 RX ORDER — MISOPROSTOL 200 UG/1
800 TABLET ORAL ONCE AS NEEDED
Status: DISCONTINUED | OUTPATIENT
Start: 2022-09-13 | End: 2022-09-14 | Stop reason: HOSPADM

## 2022-09-13 RX ORDER — CALCIUM CARBONATE 200(500)MG
2 TABLET,CHEWABLE ORAL 3 TIMES DAILY PRN
Status: DISCONTINUED | OUTPATIENT
Start: 2022-09-13 | End: 2022-09-14 | Stop reason: HOSPADM

## 2022-09-13 RX ORDER — OXYCODONE AND ACETAMINOPHEN 10; 325 MG/1; MG/1
1 TABLET ORAL EVERY 4 HOURS PRN
Status: DISCONTINUED | OUTPATIENT
Start: 2022-09-13 | End: 2022-09-13

## 2022-09-13 RX ORDER — MISOPROSTOL 200 UG/1
600 TABLET ORAL ONCE AS NEEDED
Status: DISCONTINUED | OUTPATIENT
Start: 2022-09-13 | End: 2022-09-14 | Stop reason: HOSPADM

## 2022-09-13 RX ORDER — CARBOPROST TROMETHAMINE 250 UG/ML
250 INJECTION, SOLUTION INTRAMUSCULAR
Status: DISCONTINUED | OUTPATIENT
Start: 2022-09-13 | End: 2022-09-14 | Stop reason: HOSPADM

## 2022-09-13 RX ORDER — OXYCODONE HYDROCHLORIDE AND ACETAMINOPHEN 5; 325 MG/1; MG/1
1 TABLET ORAL EVERY 4 HOURS PRN
Status: DISCONTINUED | OUTPATIENT
Start: 2022-09-13 | End: 2022-09-13

## 2022-09-13 RX ORDER — ACETAMINOPHEN 160 MG/5ML
325 SOLUTION ORAL EVERY 4 HOURS PRN
Status: DISCONTINUED | OUTPATIENT
Start: 2022-09-13 | End: 2022-09-14 | Stop reason: HOSPADM

## 2022-09-13 RX ORDER — METHYLERGONOVINE MALEATE 0.2 MG/ML
200 INJECTION INTRAVENOUS ONCE AS NEEDED
Status: DISCONTINUED | OUTPATIENT
Start: 2022-09-13 | End: 2022-09-14 | Stop reason: HOSPADM

## 2022-09-13 RX ORDER — OXYTOCIN/0.9 % SODIUM CHLORIDE 30/500 ML
125 PLASTIC BAG, INJECTION (ML) INTRAVENOUS CONTINUOUS PRN
Status: DISCONTINUED | OUTPATIENT
Start: 2022-09-13 | End: 2022-09-14 | Stop reason: HOSPADM

## 2022-09-13 RX ORDER — ONDANSETRON 2 MG/ML
4 INJECTION INTRAMUSCULAR; INTRAVENOUS EVERY 6 HOURS PRN
Status: DISCONTINUED | OUTPATIENT
Start: 2022-09-13 | End: 2022-09-14 | Stop reason: HOSPADM

## 2022-09-13 RX ORDER — HYDROCORTISONE 25 MG/G
1 CREAM TOPICAL AS NEEDED
Status: DISCONTINUED | OUTPATIENT
Start: 2022-09-13 | End: 2022-09-14 | Stop reason: HOSPADM

## 2022-09-13 RX ORDER — DOCUSATE SODIUM 100 MG/1
100 CAPSULE, LIQUID FILLED ORAL 2 TIMES DAILY
Status: DISCONTINUED | OUTPATIENT
Start: 2022-09-13 | End: 2022-09-14 | Stop reason: HOSPADM

## 2022-09-13 RX ORDER — HYDROXYZINE 50 MG/1
50 TABLET, FILM COATED ORAL NIGHTLY PRN
Status: DISCONTINUED | OUTPATIENT
Start: 2022-09-13 | End: 2022-09-14 | Stop reason: HOSPADM

## 2022-09-13 RX ORDER — LEVOTHYROXINE SODIUM 0.05 MG/1
50 TABLET ORAL
Status: DISCONTINUED | OUTPATIENT
Start: 2022-09-14 | End: 2022-09-14 | Stop reason: HOSPADM

## 2022-09-13 RX ORDER — BISACODYL 10 MG
10 SUPPOSITORY, RECTAL RECTAL DAILY PRN
Status: DISCONTINUED | OUTPATIENT
Start: 2022-09-13 | End: 2022-09-14 | Stop reason: HOSPADM

## 2022-09-13 RX ORDER — OXYCODONE HCL 5 MG/5 ML
10 SOLUTION, ORAL ORAL EVERY 4 HOURS PRN
Status: DISCONTINUED | OUTPATIENT
Start: 2022-09-13 | End: 2022-09-14 | Stop reason: HOSPADM

## 2022-09-13 RX ORDER — OXYTOCIN/0.9 % SODIUM CHLORIDE 30/500 ML
999 PLASTIC BAG, INJECTION (ML) INTRAVENOUS ONCE
Status: DISCONTINUED | OUTPATIENT
Start: 2022-09-13 | End: 2022-09-14 | Stop reason: HOSPADM

## 2022-09-13 RX ORDER — ONDANSETRON 4 MG/1
4 TABLET, FILM COATED ORAL EVERY 8 HOURS PRN
Status: DISCONTINUED | OUTPATIENT
Start: 2022-09-13 | End: 2022-09-14 | Stop reason: HOSPADM

## 2022-09-13 RX ORDER — OXYCODONE HCL 5 MG/5 ML
5 SOLUTION, ORAL ORAL EVERY 4 HOURS PRN
Status: DISCONTINUED | OUTPATIENT
Start: 2022-09-13 | End: 2022-09-14 | Stop reason: HOSPADM

## 2022-09-13 RX ORDER — OXYTOCIN/0.9 % SODIUM CHLORIDE 30/500 ML
250 PLASTIC BAG, INJECTION (ML) INTRAVENOUS CONTINUOUS PRN
Status: ACTIVE | OUTPATIENT
Start: 2022-09-13 | End: 2022-09-13

## 2022-09-13 RX ADMIN — ACETAMINOPHEN 325 MG: 325 SUSPENSION ORAL at 08:18

## 2022-09-13 RX ADMIN — OXYCODONE HYDROCHLORIDE 5 MG: 5 SOLUTION ORAL at 04:19

## 2022-09-13 RX ADMIN — OXYTOCIN 125 MILLI-UNITS/MIN: 10 INJECTION INTRAVENOUS at 01:36

## 2022-09-13 RX ADMIN — DOCUSATE SODIUM 100 MG: 100 CAPSULE, LIQUID FILLED ORAL at 20:44

## 2022-09-13 RX ADMIN — DOCUSATE SODIUM 100 MG: 100 CAPSULE, LIQUID FILLED ORAL at 09:58

## 2022-09-13 RX ADMIN — ACETAMINOPHEN 325 MG: 325 SUSPENSION ORAL at 19:06

## 2022-09-13 RX ADMIN — ACETAMINOPHEN 325 MG: 325 SUSPENSION ORAL at 04:19

## 2022-09-13 RX ADMIN — Medication: at 08:17

## 2022-09-13 RX ADMIN — IBUPROFEN 800 MG: 100 SUSPENSION ORAL at 13:08

## 2022-09-13 RX ADMIN — IBUPROFEN 800 MG: 800 TABLET, FILM COATED ORAL at 01:15

## 2022-09-13 RX ADMIN — IBUPROFEN 800 MG: 100 SUSPENSION ORAL at 21:10

## 2022-09-13 RX ADMIN — OXYCODONE HYDROCHLORIDE 5 MG: 5 SOLUTION ORAL at 08:18

## 2022-09-13 RX ADMIN — OXYCODONE HYDROCHLORIDE 5 MG: 5 SOLUTION ORAL at 19:06

## 2022-09-13 RX ADMIN — HYDROXYZINE HYDROCHLORIDE 50 MG: 50 TABLET ORAL at 03:17

## 2022-09-13 RX ADMIN — OXYTOCIN 999 ML/HR: 10 INJECTION INTRAVENOUS at 00:04

## 2022-09-13 NOTE — PROGRESS NOTES
[facility]  Vaginal Delivery Progress Note    Patient Name: Reed Xavier  :  1990  MRN:  [id number]      Subjective   Postpartum Day 1-- delivered 2359: Vaginal Delivery of a male infant.     The patient feels well without complaints.  Her pain is well controlled.  Reports normal lochia.     The patient plans to breastfeed.    Objective     Vital Signs Range for the last 24 hours  Temperature: Temp:  [97.7 °F (36.5 °C)-99.1 °F (37.3 °C)] 98.2 °F (36.8 °C)       BP: BP: ()/(57-91) 128/81   Pulse: Heart Rate:  [] 70   Respirations: Resp:  [15-20] 18                       Physical Exam:  General: Awake and alert  Abdomen: Fundus: firm, non tender, 1 below umbilicus  Perineum w/ mod edema, f/c in place  Extremities:  trace edema, NT     Labs:     Results from last 7 days   Lab Units 22  0722 22  0758   WBC 10*3/mm3 15.81* 8.23   HEMOGLOBIN g/dL 9.0* 10.6*   HEMATOCRIT % 27.5* 31.2*   PLATELETS 10*3/mm3 226 257       Prenatal labs results reviewed:  Yes   Rubella:  Immune  Rh Status:    RH type   Date Value Ref Range Status   2022 Positive  Final         Assessment & Plan  : 1. PPD1 S/P  - Doing well, continue usual cares.     Still w/ f/c-- will get out this am.      Desires circ for baby boy-- d/w           Pregnancy    Hypothyroidism affecting pregnancy          Petra Whiting, BRANDON  2022  09:14 EDT   Patient delivered at midnight. Will wait on Circ.  Patient seen and examined. Agree with above assessment.     Gretchen Watson MD  2022  16:08 EDT

## 2022-09-13 NOTE — PLAN OF CARE
Goal Outcome Evaluation:    OF viable male over 2nd degree lac repaired with vag packing in place @ 9232

## 2022-09-13 NOTE — LACTATION NOTE
P1T. Dr. Melchor referred  to see patient . Baby is a little jittery and has been gaggy and spitty this morning. Patient given hand pump and breast massage practiced with hand expression. Enc S2S and placing expressed colostrum on infants lips until he shows feeding cues and has a nutritive feeding. Patient has a Spectra one pump . Enc to call  for latch help as needed.   Lactation Consult Note    Evaluation Completed: 2022 09:22 EDT  Patient Name: Reed Xavier  :  1990  MRN:  [id number]     REFERRAL  INFORMATION:                          Date of Referral: 22   Person Making Referral: physician  Maternal Reason for Referral: breastfeeding currently, no prior breastfeeding experience  Infant Reason for Referral: regurgitation, sleepy    DELIVERY HISTORY:        Skin to skin initiation date/time: 2022  12:13 AM   Skin to skin end date/time:           MATERNAL ASSESSMENT:  Breast Size Issue: none (22)  Breast Shape: round (22)  Breast Density: soft (22)  Areola: dense (22)  Nipples: flat (22)     Left Nipple Symptoms: intact (22)  Right Nipple Symptoms: intact (22)       INFANT ASSESSMENT:  Information for the patient's :  Felix Xavier [[id number]]   No past medical history on file.                                                                                                     MATERNAL INFANT FEEDING:     Maternal Emotional State: receptive (22)  Infant Positioning: other (see comments) (S2S) (22)                  Milk Ejection Reflex: present (22)                                           EQUIPMENT TYPE:  Breast Pump Type: double electric, personal, manual pump (22)  Breast Pump Flange Type: hard (22)  Breast Pump Flange Size: 24 mm (22)                        BREAST PUMPING:  Breast Pumping Interventions: early pumping  promoted (09/13/22 0900)       LACTATION REFERRALS:  Lactation Referrals: outpatient lactation program (09/13/22 0900)

## 2022-09-13 NOTE — ANESTHESIA POSTPROCEDURE EVALUATION
"Patient: Reed Xavier    Procedure Summary     Date: 09/12/22 Room / Location:     Anesthesia Start: 1313 Anesthesia Stop: 2359    Procedure: LABOR ANALGESIA Diagnosis:     Scheduled Providers:  Provider: Shlomo Garcia MD    Anesthesia Type: epidural ASA Status: 2          Anesthesia Type: epidural    Vitals  Vitals Value Taken Time   /83 09/13/22 0400   Temp 36.5 °C (97.7 °F) 09/13/22 0400   Pulse 79 09/13/22 0400   Resp 16 09/13/22 0400   SpO2 98 % 09/13/22 0024   Vitals shown include unvalidated device data.        Post Anesthesia Care and Evaluation    Patient location during evaluation: PACU  Patient participation: complete - patient participated  Level of consciousness: awake and alert  Pain management: adequate    Airway patency: patent  Anesthetic complications: No anesthetic complications  PONV Status: controlled  Cardiovascular status: acceptable and hemodynamically stable  Respiratory status: acceptable  Hydration status: acceptable    Comments: /83 (BP Location: Right arm, Patient Position: Lying)   Pulse 79   Temp 36.5 °C (97.7 °F) (Oral)   Resp 16   Ht 167.6 cm (66\")   Wt 90.7 kg (200 lb)   SpO2 100%   Breastfeeding Yes   BMI 32.28 kg/m²       "

## 2022-09-13 NOTE — L&D DELIVERY NOTE
[facility]  Vaginal Delivery Note    Patient Name: Reed Xavier  :  1990  MRN:  [id number]          Pregnancy    Hypothyroidism affecting pregnancy      Labor status: Induction of labor       Delivery     Delivery:      YOB: 2021    Time of Birth: 11:59 PM      Anesthesia:      Delivering clinician: Reena Marsh MD       Infant    Findings: Viable male  infant    Infant observations: Weight: 3175 g (7 lb)   Observations/Comments:  Panda LDR 5      Apgars: 8  @ 1 minute /    9  @ 5 minutes     Placenta, Cord, and Fluid    Placenta delivered  Spontaneous   at  2022 12:02 AM     Cord:   present.   Cord blood obtained:     Cord gases obtained:        Repair    Episiotomy: No   Lacerations: Second degree         Estimated Blood Loss:  Quantitative Blood Loss:    150 mls.            Delivery narrative: The patient is a 31 y.o.  at 39w3d.  Presented  For pitocin induction. gbs neg. Pitocin was begun with arom of clear fluid.  Epidural had been placed and worked well throughout. Progressed normally to C/C/+1. She pushed for one hour with minimal descent and decision was made to allow her to labor down for 1hr. .she then pushed another hour with minimal descent.  efw was adequate/small.. pelvis adequate. Pt was offered a trial with the kiwi given exhaustion and perineal edema.  She did want to try. Kiwi was easily placed and with one pull the head was assisted with her pushing to +3.  It was then removed and baby promptly delivered . Fetal status reassuring throughout.  of viable male infant  @ 11:59 PM  over an intact perineum. There was a nuchal cord that was reduced.  ASDE. 3175 g (7 lb) .  Placenta delivered spontaneously, intact with 3 vessel cord. Cervix and rectum intact. second degree laceration repaired in usual fashion with 3-0vicryl suture. Mother and baby recovering good condition. Given her significant edema, a naylor was placed and a vag pack was placed.  The  laceration did repair well w minimal bleeding, pack will be removed in 2hrs.      Reena Marsh MD  09/13/22  00:22 EDT

## 2022-09-13 NOTE — NURSING NOTE
Vaginal packing removed and intact.  Bleeding stable, light rubra noted, no clots.  Ice pack applied to michelle area

## 2022-09-14 VITALS
DIASTOLIC BLOOD PRESSURE: 71 MMHG | HEART RATE: 81 BPM | WEIGHT: 200 LBS | BODY MASS INDEX: 32.14 KG/M2 | RESPIRATION RATE: 16 BRPM | TEMPERATURE: 97.8 F | SYSTOLIC BLOOD PRESSURE: 111 MMHG | OXYGEN SATURATION: 100 % | HEIGHT: 66 IN

## 2022-09-14 RX ORDER — OXYCODONE HCL 5 MG/5 ML
5 SOLUTION, ORAL ORAL EVERY 4 HOURS PRN
Qty: 45 ML | Refills: 0 | Status: SHIPPED | OUTPATIENT
Start: 2022-09-14 | End: 2022-09-16

## 2022-09-14 RX ORDER — OXYCODONE HCL 5 MG/5 ML
5 SOLUTION, ORAL ORAL EVERY 4 HOURS PRN
Qty: 8 ML | Refills: 0 | Status: SHIPPED | OUTPATIENT
Start: 2022-09-14 | End: 2022-09-14 | Stop reason: SDUPTHER

## 2022-09-14 RX ADMIN — DOCUSATE SODIUM 100 MG: 100 CAPSULE, LIQUID FILLED ORAL at 08:34

## 2022-09-14 RX ADMIN — ACETAMINOPHEN 325 MG: 325 SUSPENSION ORAL at 00:32

## 2022-09-14 RX ADMIN — OXYCODONE HYDROCHLORIDE 5 MG: 5 SOLUTION ORAL at 00:32

## 2022-09-14 RX ADMIN — ACETAMINOPHEN 325 MG: 325 SUSPENSION ORAL at 08:34

## 2022-09-14 RX ADMIN — IBUPROFEN 800 MG: 100 SUSPENSION ORAL at 05:14

## 2022-09-14 RX ADMIN — LEVOTHYROXINE SODIUM 50 MCG: 0.05 TABLET ORAL at 05:55

## 2022-09-14 NOTE — LACTATION NOTE
Mom reports baby isnt really latching. She used hand pump and is getting 0.2 cc's to syringe to baby. Assisted mom with latching baby. Baby unable to grasp breast at this time so 24 mm nipple shield was used. Educated parents on use and cleaning of NS. Baby BF on both breasts while LC used hand pump to express 1.3 cc's of colostrum. All colostrum given back to baby. Mom reports this is the best BF session baby has done. Encouraged to cont BF every 2-3 hours, pump after and supplement all colostrum to baby. Once baby is BF better mom can back down on pumping. She will cont to work on latching without NS. [facility] to make sure BF going well. Baby has f/u with pediatrician within a few days. Educated on baby's expected output and weight gain  Lactation Consult Note    Evaluation Completed: 2022 10:47 EDT  Patient Name: Reed Xavier  :  1990  MRN:  [id number]     REFERRAL  INFORMATION:                          Date of Referral: 22   Person Making Referral: physician  Maternal Reason for Referral: breastfeeding currently, no prior breastfeeding experience  Infant Reason for Referral: regurgitation, sleepy    DELIVERY HISTORY:        Skin to skin initiation date/time: 2022  12:13 AM   Skin to skin end date/time:           MATERNAL ASSESSMENT:                               INFANT ASSESSMENT:  Information for the patient's :  Felix Xavier [[id number]]   No past medical history on file.                                                                                                     MATERNAL INFANT FEEDING:                                                                       EQUIPMENT TYPE:                                 BREAST PUMPING:          LACTATION REFERRALS:

## 2022-09-14 NOTE — PLAN OF CARE
Goal Outcome Evaluation:   Patient education complete. Patient ready for discharge to home with . Self care.

## 2022-09-14 NOTE — DISCHARGE SUMMARY
Date of Discharge:  2022    Discharge Diagnosis: vaginal delivery    Presenting Problem/History of Present Illness  Pregnancy [Z34.90]       Hospital Course  Patient is a 31 y.o. female presented for term IOL.  Delivered viable male infant per Dr. Marsh.  Struggling some w/ bf but doing ok.  Ready for d/c.  Desires circ for baby boy-- d/w .      Procedures Performed         Consults:   Consults     No orders found from 2022 to 2022.          Condition on Discharge:   Subjective   Postpartum Day 2 Vaginal Delivery.    The patient feels well without complaints.    Vital Signs  Temp:  [97.6 °F (36.4 °C)-98.5 °F (36.9 °C)] 97.8 °F (36.6 °C)  Heart Rate:  [81-85] 81  Resp:  [16-18] 16  BP: (111-121)/(70-83) 111/71    Physical Exam:   General: Awake and alert   Abdomen: Fundus: firm, non tender    Extremities:  Calves NT bilaterally    Assessment & Plan     PPD2  S/P  -   Stable for discharge. Instructions reviewed      Discharge Disposition  Home or Self Care    Discharge Medications     Discharge Medications      New Medications      Instructions Start Date   ibuprofen 100 MG/5ML suspension  Commonly known as: ADVIL,MOTRIN   800 mg, Oral, Every 8 Hours PRN      oxyCODONE 5 MG/5ML solution  Commonly known as: ROXICODONE   5 mg, Oral, Every 4 Hours PRN         Continue These Medications      Instructions Start Date   cetirizine 10 MG tablet  Commonly known as: zyrTEC   10 mg, Oral, Daily      levothyroxine 50 MCG tablet  Commonly known as: SYNTHROID, LEVOTHROID   50 mcg, Oral, Every Early Morning      prenatal (CLASSIC) vitamin  tablet  Generic drug: prenatal vitamin   Oral, Daily               The patient has been prescribed a controlled substance.  She has been counseled on the risks associated with using the medication.   The addictive potential of this medication and alternatives were discussed carefully with this patient and she demonstrated understanding.  A ENDER report has been obtained and  reviewed.       Activity at Discharge: restrictions reviewed    Follow-up Appointments  No future appointments.      Test Results Pending at Discharge       Petra Whiting, BRANDON  09/14/22  09:34 EDT

## 2023-08-14 ENCOUNTER — OFFICE VISIT (OUTPATIENT)
Dept: FAMILY MEDICINE CLINIC | Facility: CLINIC | Age: 33
End: 2023-08-14
Payer: COMMERCIAL

## 2023-08-14 VITALS
OXYGEN SATURATION: 98 % | TEMPERATURE: 97.8 F | WEIGHT: 178.4 LBS | SYSTOLIC BLOOD PRESSURE: 118 MMHG | HEIGHT: 66 IN | DIASTOLIC BLOOD PRESSURE: 70 MMHG | BODY MASS INDEX: 28.67 KG/M2 | HEART RATE: 105 BPM

## 2023-08-14 DIAGNOSIS — Z82.49 FAMILY HISTORY OF CARDIOVASCULAR DISEASE: Chronic | ICD-10-CM

## 2023-08-14 DIAGNOSIS — Z00.00 PHYSICAL EXAM: ICD-10-CM

## 2023-08-14 DIAGNOSIS — E03.8 OTHER SPECIFIED HYPOTHYROIDISM: Chronic | ICD-10-CM

## 2023-08-14 DIAGNOSIS — F41.9 ANXIETY: Primary | Chronic | ICD-10-CM

## 2023-08-14 DIAGNOSIS — Z83.3 FAMILY HISTORY OF DIABETES MELLITUS: Chronic | ICD-10-CM

## 2023-08-14 PROBLEM — E03.9 HYPOTHYROIDISM AFFECTING PREGNANCY: Status: RESOLVED | Noted: 2022-09-12 | Resolved: 2023-08-14

## 2023-08-14 PROBLEM — I10 ESSENTIAL HYPERTENSION: Status: ACTIVE | Noted: 2023-08-14

## 2023-08-14 PROBLEM — I10 ESSENTIAL HYPERTENSION: Status: RESOLVED | Noted: 2023-08-14 | Resolved: 2023-08-14

## 2023-08-14 PROBLEM — J30.2 SEASONAL ALLERGIES: Chronic | Status: ACTIVE | Noted: 2023-08-14

## 2023-08-14 PROBLEM — O99.280 HYPOTHYROIDISM AFFECTING PREGNANCY: Status: RESOLVED | Noted: 2022-09-12 | Resolved: 2023-08-14

## 2023-08-14 PROBLEM — Z34.90 PREGNANCY: Status: RESOLVED | Noted: 2022-09-12 | Resolved: 2023-08-14

## 2023-08-14 PROBLEM — Z87.42 HISTORY OF ABNORMAL CERVICAL PAP SMEAR: Status: ACTIVE | Noted: 2019-11-25

## 2023-08-14 PROCEDURE — 99204 OFFICE O/P NEW MOD 45 MIN: CPT | Performed by: FAMILY MEDICINE

## 2023-08-14 PROCEDURE — 96127 BRIEF EMOTIONAL/BEHAV ASSMT: CPT | Performed by: FAMILY MEDICINE

## 2023-08-14 RX ORDER — ESCITALOPRAM OXALATE 10 MG/1
10 TABLET ORAL DAILY
Qty: 90 TABLET | Refills: 3 | Status: SHIPPED | OUTPATIENT
Start: 2023-08-14 | End: 2024-08-08

## 2023-08-14 RX ORDER — LEVOTHYROXINE SODIUM 0.05 MG/1
50 TABLET ORAL
Qty: 90 TABLET | Refills: 3 | Status: SHIPPED | OUTPATIENT
Start: 2023-08-14 | End: 2024-08-08

## 2023-08-14 RX ORDER — ESCITALOPRAM OXALATE 10 MG/1
10 TABLET ORAL DAILY
COMMUNITY
End: 2023-08-14 | Stop reason: SDUPTHER

## 2023-08-14 NOTE — PROGRESS NOTES
Chief Complaint  Chief Complaint   Patient presents with    Establish Care       Subjective    History of Present Illness  Reed Xavier is a 32 y.o. female presents to [facility] to establish care.  Occupation: pediatrician at [employer]  Hobbies: hang out with her  and 11mo, watch movies/tv, travel  Diet: average- pretty balanced  Physical activity: walking, occasional weight training  Support system: , parents, inlaws-mother-in-law lives with them and provides childcare.  Her family lives in [place].  Sleep: good  Mood: good  Health goals: would like to get back to pre baby weight- was in 140s before she had her son. Currently breastfeeding until 12 mo    Past Medical History:   Diagnosis Date    Anxiety     Disease of thyroid gland        Past Surgical History:   Procedure Laterality Date    DENTAL PROCEDURE      WISDOM TOOTH EXTRACTION Bilateral 2009    WISDOM TOOTH EXTRACTION Bilateral            Family History   Problem Relation Age of Onset    Cancer Mother         dx age 54- stage 1 +ER breast cancer    Hashimoto's thyroiditis Mother     Hypertension Father     Anxiety disorder Sister     Inguinal hernia Son         repaired at 3 mo    Acute myelogenous leukemia Maternal Grandmother     Hypertension Maternal Grandmother     Atrial fibrillation Maternal Grandmother     Heart attack Maternal Grandfather     Diabetes type II Maternal Grandfather     Kidney cancer Paternal Grandmother     Diabetes type II Paternal Grandmother     Heart attack Paternal Grandfather          age 45       Health Maintenance Summary            Postponed - COVID-19 Vaccine (3 - Booster for Keegan series) Postponed until 11/3/2023      2023  Postponed until 11/3/2023 by Kelli Faria MD (Product Unavailable)    2022  Imm Admin: COVID-19 (PFIZER) PURPLE CAP    2021  Imm Admin: COVID-19 (KEEGAN)              Postponed - PAP SMEAR  "(Every 3 Years) Postponed until 7/30/2027 08/14/2023  Postponed until 7/30/2027 by Kelli Faria MD (Not Indicated)              INFLUENZA VACCINE (Yearly - October to March) Next due on 10/1/2023      09/30/2022  Outside Immunization: Flu MDCK Quad P-Free Inj    10/05/2021  Outside Immunization: Influenza, injectable, quadrivalent, preservative free    10/05/2020  Outside Immunization: Influenza, injectable, MDCK, preservative free, quadrivalent              ANNUAL PHYSICAL (Yearly) Next due on 8/14/2024 08/14/2023  Done              TDAP/TD VACCINES (3 - Td or Tdap) Next due on 6/24/2032 06/24/2022  Outside Immunization: Tdap    10/31/2017  Outside Immunization: Tdap              HEPATITIS C SCREENING  Completed      02/02/2022  Hepatitis C Antibody              Pneumococcal Vaccine 0-64 (Series Information) Aged Out      No completion, postpone, or frequency change history exists for this topic.                     Objective   Vitals:    08/14/23 1002   BP: 118/70   BP Location: Left arm   Patient Position: Sitting   Cuff Size: Adult   Pulse: 105   Temp: 97.8 øF (36.6 øC)   TempSrc: Oral   SpO2: 98%   Weight: 80.9 kg (178 lb 6.4 oz)   Height: 167.6 cm (65.98\")        BMI is >= 25 and <30. (Overweight) The following options were offered after discussion;: We will defer until finishes breast-feeding to ensure adequate milk supply       Physical Exam  Vitals reviewed.   Constitutional:       General: She is not in acute distress.     Appearance: Normal appearance.   HENT:      Head: Normocephalic and atraumatic.      Right Ear: Tympanic membrane, ear canal and external ear normal.      Left Ear: Tympanic membrane, ear canal and external ear normal.      Nose: Nose normal. No rhinorrhea.      Mouth/Throat:      Mouth: Mucous membranes are moist.      Pharynx: No posterior oropharyngeal erythema.      Comments: Slightly enlarged tonsils bilaterally  Eyes:      Extraocular Movements: Extraocular " movements intact.      Conjunctiva/sclera: Conjunctivae normal.      Pupils: Pupils are equal, round, and reactive to light.   Cardiovascular:      Rate and Rhythm: Normal rate and regular rhythm.      Pulses: Normal pulses.      Heart sounds: Normal heart sounds. No murmur heard.  Pulmonary:      Effort: Pulmonary effort is normal.      Breath sounds: Normal breath sounds. No wheezing.   Abdominal:      General: Bowel sounds are normal. There is no distension.      Palpations: Abdomen is soft.      Tenderness: There is no abdominal tenderness.   Musculoskeletal:         General: No tenderness or deformity. Normal range of motion.      Cervical back: Normal range of motion and neck supple.   Lymphadenopathy:      Cervical: No cervical adenopathy.   Skin:     General: Skin is warm and dry.      Findings: No lesion or rash.   Neurological:      General: No focal deficit present.      Mental Status: She is alert and oriented to person, place, and time.      Gait: Gait normal.      Deep Tendon Reflexes: Reflexes normal.   Psychiatric:         Mood and Affect: Mood normal.         Behavior: Behavior normal.         Judgment: Judgment normal.          The following data was reviewed by: Kelli Faria MD on 08/14/2023:  Common labs          9/12/2022    07:58 9/13/2022    07:22   Common Labs   WBC 8.23  15.81    Hemoglobin 10.6  9.0    Hematocrit 31.2  27.5    Platelets 257  226      Reviewed:  Previous endocrinology and OB/GYN notes in chart    DANNA-7 (General Anxiety Disorder-7) from [email or web address]  on 8/14/2023  ** All calculations should be rechecked by clinician prior to use **    RESULT SUMMARY:  0 points  No anxiety disorder.       INPUTS:  Feeling nervous, anxious, or on edge --> 0 = Not at all  Not being able to stop or control worrying --> 0 = Not at all  Worrying too much about different things --> 0 = Not at all  Trouble relaxing --> 0 = Not at all  Being so restless that it's hard to sit still --> 0 = Not at  all  Becoming easily annoyed or irritable --> 0 = Not at all  Feeling afraid as if something awful might happen --> 0 = Not at all    PHQ-2 Depression Screening  Little interest or pleasure in doing things? 0-->not at all   Feeling down, depressed, or hopeless? 0-->not at all   PHQ-2 Total Score 0         Assessment and Plan  Reed Xavier is a 32 y.o. female presents to [facility] today to establish care, chart reviewed and updated, medications reviewed, labs reviewed, preventative med reviewed. Pt is currently doing well, no concerning findings on history or exam. Answered all questions at this time, pt expressed no further concerns today.  To follow-up in 1 year for annual exam, sooner prn.     Preventative medicine:   BP: normal  Lipid Panel :   Ordered patient will have drawn after cessation of breast-feeding  Fasting Blood Sugar:  Ordered patient will have drawn after cessation of breast-feeding  Hep C screening: Negative  HIV Screen UTD -negative  STI screening: Negative  Colonoscopy : Due age 50  Lung cancer screening: N/A  Immunizations UTD: Yes  Anxiety/depression screening: normal PHQ 2 and DANNA-7 negative  Tobacco cessation counseling: N/A      Women:   Pap: Up to date.  Normal due 2027  Mammogram:  Due age 40    Osteoporosis Screen:  Due age 65      Diagnoses and all orders for this visit:    1. Anxiety (Primary)  Overview:  On lexapro    Assessment & Plan:  - Discussed referral for counseling, patient deferred at this time  - Pharmacologic therapy: Continue Lexapro 10 mg  -Patient to go to ER immediately if SI/HI develop.   -If needed may increase dose, RTC as needed    Orders:  -     escitalopram (LEXAPRO) 10 MG tablet; Take 1 tablet by mouth Daily for 360 days.  Dispense: 90 tablet; Refill: 3  -     TSH Rfx On Abnormal To Free T4; Future    2. Physical exam    3. Other specified hypothyroidism  Overview:  On synthroid    Assessment & Plan:  Previously saw  endocrinology.  Reports stable labs on current dose.  -Repeat TSH pending  -Refill meds    Orders:  -     levothyroxine (SYNTHROID, LEVOTHROID) 50 MCG tablet; Take 1 tablet by mouth Every Morning for 360 days.  Dispense: 90 tablet; Refill: 3  -     TSH Rfx On Abnormal To Free T4; Future    4. Care and examination of lactating mother  Comments:  Patient currently breast-feeding, going well.  Discussed weaning strategies to avoid risk of mastitis.  Follow-up as needed    5. Family history of diabetes mellitus  -     CBC & Differential; Future  -     Basic Metabolic Panel; Future  -     Lipid Panel; Future  -     Hemoglobin A1c; Future    6. Family history of cardiovascular disease  -     CBC & Differential; Future  -     Basic Metabolic Panel; Future  -     Lipid Panel; Future  -     Hemoglobin A1c; Future          Patient voiced understanding and agreement with plan of care and had no further questions or concerns at this time.     I spent 48 minutes on this encounter, including chart review of any relevant previous encounters, labs, and imaging;  >%50% of encounter spent face-to-face with the patient or coordinating care.    Kelli Faria MD  Family Medicine  [facility]    Follow Up  Return if symptoms worsen or fail to improve; after weaning schedule RN visit for labs, for Annual.    Patient Instructions   Today: Refill meds    Weaning: Slowly decrease pump/nursing time until able to drop pump/nursing session. Can speed up drying out milk with sudafed, peppermint (Altoids), cabbage leaf bra, ice packs on breast, or No More Milk tea.    Allergies: Consider 2x daily neti pot before flonase. If no improvement tin 3-4 weeks let me know and we can try adding singulair.     Labs: Will get CV screening labs after weaning.         No excercise/No heavy lifting/No sports/gym/Weight bearing as tolerated/Elevate extremity/No tub baths

## 2023-08-14 NOTE — PATIENT INSTRUCTIONS
Today: Refill meds    Weaning: Slowly decrease pump/nursing time until able to drop pump/nursing session. Can speed up drying out milk with sudafed, peppermint (Altoids), cabbage leaf bra, ice packs on breast, or No More Milk tea.    Allergies: Consider 2x daily neti pot before flonase. If no improvement tin 3-4 weeks let me know and we can try adding singulair.     Labs: Will get CV screening labs after weaning.

## 2023-08-14 NOTE — ASSESSMENT & PLAN NOTE
Previously saw endocrinology.  Reports stable labs on current dose.  -Repeat TSH pending  -Refill meds

## 2023-08-14 NOTE — ASSESSMENT & PLAN NOTE
- Discussed referral for counseling, patient deferred at this time  - Pharmacologic therapy: Continue Lexapro 10 mg  -Patient to go to ER immediately if SI/HI develop.   -If needed may increase dose, RTC as needed

## 2023-08-14 NOTE — ASSESSMENT & PLAN NOTE
Patient reporting continued symptoms despite daily Zyrtec and Flonase, utilizes correct technique for nasal spray  -Patient may try twice daily [place] pot before Flonase  -Continue allergen avoidance: HEPA filter on home HVAC or air purifier for home/office, do not sleep with windows open, vacuum/sweep regularly, no pets sleep in bedroom, allergy cover for mattress and pillow.   -If no improvement with 2 weeks of Mendon pot use, will add Singulair.

## 2023-09-01 ENCOUNTER — PATIENT ROUNDING (BHMG ONLY) (OUTPATIENT)
Dept: FAMILY MEDICINE CLINIC | Facility: CLINIC | Age: 33
End: 2023-09-01
Payer: COMMERCIAL

## 2023-09-01 NOTE — PROGRESS NOTES
Good afternoon Mrs. Xavier. My name is Farhana and I am the practice manager for [facility]s office. I hope your recent visit with our office went smoothly. If you have questions or concerns you may reach me at [phone]. Thank you and Have a great day.

## 2023-10-24 ENCOUNTER — CLINICAL SUPPORT (OUTPATIENT)
Dept: FAMILY MEDICINE CLINIC | Facility: CLINIC | Age: 33
End: 2023-10-24
Payer: COMMERCIAL

## 2023-10-24 DIAGNOSIS — E03.8 OTHER SPECIFIED HYPOTHYROIDISM: Chronic | ICD-10-CM

## 2023-10-24 DIAGNOSIS — Z82.49 FAMILY HISTORY OF CARDIOVASCULAR DISEASE: Chronic | ICD-10-CM

## 2023-10-24 DIAGNOSIS — Z83.3 FAMILY HISTORY OF DIABETES MELLITUS: Chronic | ICD-10-CM

## 2023-10-24 DIAGNOSIS — F41.9 ANXIETY: Chronic | ICD-10-CM

## 2023-10-24 NOTE — PROGRESS NOTES
Venipuncture Blood Specimen Collection  Venipuncture performed in Right Arm by Jason Monique CMA with good hemostasis. Patient tolerated the procedure well without complications.   10/24/23   Jason Monique CMA

## 2023-10-25 LAB
BASOPHILS # BLD AUTO: 0 X10E3/UL (ref 0–0.2)
BASOPHILS NFR BLD AUTO: 0 %
BUN SERPL-MCNC: 10 MG/DL (ref 6–20)
BUN/CREAT SERPL: 12 (ref 9–23)
CALCIUM SERPL-MCNC: 9.2 MG/DL (ref 8.7–10.2)
CHLORIDE SERPL-SCNC: 108 MMOL/L (ref 96–106)
CHOLEST SERPL-MCNC: 149 MG/DL (ref 100–199)
CO2 SERPL-SCNC: 21 MMOL/L (ref 20–29)
CREAT SERPL-MCNC: 0.86 MG/DL (ref 0.57–1)
EGFRCR SERPLBLD CKD-EPI 2021: 92 ML/MIN/1.73
EOSINOPHIL # BLD AUTO: 0.1 X10E3/UL (ref 0–0.4)
EOSINOPHIL NFR BLD AUTO: 2 %
ERYTHROCYTE [DISTWIDTH] IN BLOOD BY AUTOMATED COUNT: 12 % (ref 11.7–15.4)
GLUCOSE SERPL-MCNC: 104 MG/DL (ref 70–99)
HBA1C MFR BLD: 5.7 % (ref 4.8–5.6)
HCT VFR BLD AUTO: 39.5 % (ref 34–46.6)
HDLC SERPL-MCNC: 51 MG/DL
HGB BLD-MCNC: 13.2 G/DL (ref 11.1–15.9)
IMM GRANULOCYTES # BLD AUTO: 0 X10E3/UL (ref 0–0.1)
IMM GRANULOCYTES NFR BLD AUTO: 0 %
LDLC SERPL CALC-MCNC: 83 MG/DL (ref 0–99)
LYMPHOCYTES # BLD AUTO: 1.7 X10E3/UL (ref 0.7–3.1)
LYMPHOCYTES NFR BLD AUTO: 35 %
MCH RBC QN AUTO: 30.1 PG (ref 26.6–33)
MCHC RBC AUTO-ENTMCNC: 33.4 G/DL (ref 31.5–35.7)
MCV RBC AUTO: 90 FL (ref 79–97)
MONOCYTES # BLD AUTO: 0.4 X10E3/UL (ref 0.1–0.9)
MONOCYTES NFR BLD AUTO: 8 %
NEUTROPHILS # BLD AUTO: 2.6 X10E3/UL (ref 1.4–7)
NEUTROPHILS NFR BLD AUTO: 55 %
PLATELET # BLD AUTO: 297 X10E3/UL (ref 150–450)
POTASSIUM SERPL-SCNC: 4.9 MMOL/L (ref 3.5–5.2)
RBC # BLD AUTO: 4.38 X10E6/UL (ref 3.77–5.28)
SODIUM SERPL-SCNC: 141 MMOL/L (ref 134–144)
TRIGL SERPL-MCNC: 78 MG/DL (ref 0–149)
TSH SERPL DL<=0.005 MIU/L-ACNC: 2.75 UIU/ML (ref 0.45–4.5)
VLDLC SERPL CALC-MCNC: 15 MG/DL (ref 5–40)
WBC # BLD AUTO: 4.7 X10E3/UL (ref 3.4–10.8)

## 2023-10-26 DIAGNOSIS — Z83.3 FAMILY HISTORY OF DIABETES MELLITUS: Primary | Chronic | ICD-10-CM

## 2023-10-26 DIAGNOSIS — R73.9 HYPERGLYCEMIA: ICD-10-CM

## 2023-10-26 DIAGNOSIS — E03.8 OTHER SPECIFIED HYPOTHYROIDISM: Chronic | ICD-10-CM

## 2023-10-26 NOTE — PROGRESS NOTES
Hello!    Here are the results of your most recent labs:    Your CBC, Cholesterol, and Thyroid Function was all normal.     Your Blood Sugar and Hgb A1C was abnormal. This may be a because you are still breastfeeding and the body mobilizes extra blood sugar for milk production, but given your significant family history of diabetes I want to keep an eye on this. I recommend decreasing carbohydrate intake to 150-200 g/day as long as it doesn't impact your milk supply, reducing processed food, increasing fruit and vegetable intake, at least 120 minutes of physical activity per week as tolerated, and controlling blood pressure with a goal of less than 120/80.     Repeat labs in 6 months, ordered for our [place] facility- please go there fasting (no food after midnight, water/meds/black coffee ok).     Please continue your current medications. Please contact me with any questions.  Thank you!  Dr. Faria

## 2024-02-05 ENCOUNTER — PATIENT MESSAGE (OUTPATIENT)
Dept: FAMILY MEDICINE CLINIC | Facility: CLINIC | Age: 34
End: 2024-02-05
Payer: COMMERCIAL

## 2024-02-06 DIAGNOSIS — E03.8 OTHER SPECIFIED HYPOTHYROIDISM: Primary | Chronic | ICD-10-CM

## 2024-02-06 DIAGNOSIS — R73.9 HYPERGLYCEMIA: ICD-10-CM

## 2024-02-06 NOTE — TELEPHONE ENCOUNTER
From: Reed Xavier  To: Kelli Faria  Sent: 2/5/2024 11:27 PM EST  Subject: Pregnancy    Hi  Giana -    My  and I just found out we're pregnant. This was planned, and I'm still very early days - like 4 weeks. We have experienced a miscarriage in the past requiring D&C. This was followed by a second pregnancy that resulted in the birth of our son. We're both excited but anxious. My question is about my thyroid. After the miscarriage, I requested a few labs, and I was ultimately started on levothyroxine at that time. I've been on it ever since. I currently take 50 mcg tablets but rather than take one daily I've been taking 200 mcg twice weekly - every Tuesday and Saturday - for ease. Is this okay? Do I need to get repeat labs sooner now that I'm pregnant? Also, during my second pregnancy, I was prescribed vaginal progesterone suppositories to take during the early weeks. Is this something you could prescribe? I likely won't see my OB until 12 weeks for my first ultrasound.    Thank you so much!

## 2024-08-23 DIAGNOSIS — F41.9 ANXIETY: Chronic | ICD-10-CM

## 2024-08-23 RX ORDER — ESCITALOPRAM OXALATE 10 MG/1
10 TABLET ORAL DAILY
Qty: 90 TABLET | Refills: 3 | Status: SHIPPED | OUTPATIENT
Start: 2024-08-23 | End: 2025-08-18

## 2024-09-23 LAB — EXTERNAL GROUP B STREP ANTIGEN: NORMAL

## 2024-09-28 ENCOUNTER — PATIENT MESSAGE (OUTPATIENT)
Dept: FAMILY MEDICINE CLINIC | Facility: CLINIC | Age: 34
End: 2024-09-28
Payer: COMMERCIAL

## 2024-09-30 NOTE — TELEPHONE ENCOUNTER
From: Reed Xavier  To: Kelil Faria  Sent: 9/28/2024 11:01 AM EDT  Subject: Tdap vaccine    Hi there. I was able to update my flu and COVID vaccine info via [employer]; however, I also received an updated Tdap during pregnancy, and I haven't been able to find a way to update that one. [facility]/29/2024, and I was hoping to get that updated in my chart. Thanks so much!

## 2024-10-06 ENCOUNTER — HOSPITAL ENCOUNTER (EMERGENCY)
Facility: HOSPITAL | Age: 34
Discharge: HOME | End: 2024-10-06
Attending: OBSTETRICS & GYNECOLOGY | Admitting: OBSTETRICS & GYNECOLOGY
Payer: COMMERCIAL

## 2024-10-06 VITALS
DIASTOLIC BLOOD PRESSURE: 84 MMHG | WEIGHT: 209 LBS | BODY MASS INDEX: 33.59 KG/M2 | RESPIRATION RATE: 14 BRPM | TEMPERATURE: 98.2 F | OXYGEN SATURATION: 97 % | HEIGHT: 66 IN | HEART RATE: 88 BPM | SYSTOLIC BLOOD PRESSURE: 133 MMHG

## 2024-10-06 LAB
ALBUMIN SERPL-MCNC: 3.3 G/DL (ref 3.5–5.2)
ALBUMIN/GLOB SERPL: 1 G/DL
ALP SERPL-CCNC: 172 U/L (ref 39–117)
ALT SERPL W P-5'-P-CCNC: 14 U/L (ref 1–33)
ANION GAP SERPL CALCULATED.3IONS-SCNC: 13.4 MMOL/L (ref 5–15)
AST SERPL-CCNC: 23 U/L (ref 1–32)
BACTERIA UR QL AUTO: ABNORMAL /HPF
BASOPHILS # BLD AUTO: 0.01 10*3/MM3 (ref 0–0.2)
BASOPHILS NFR BLD AUTO: 0.1 % (ref 0–1.5)
BILIRUB SERPL-MCNC: <0.2 MG/DL (ref 0–1.2)
BILIRUB UR QL STRIP: NEGATIVE
BUN SERPL-MCNC: 6 MG/DL (ref 6–20)
BUN/CREAT SERPL: 8.7 (ref 7–25)
CALCIUM SPEC-SCNC: 9 MG/DL (ref 8.6–10.5)
CHLORIDE SERPL-SCNC: 106 MMOL/L (ref 98–107)
CLARITY UR: ABNORMAL
CO2 SERPL-SCNC: 15.6 MMOL/L (ref 22–29)
COLOR UR: YELLOW
CREAT SERPL-MCNC: 0.69 MG/DL (ref 0.57–1)
CREAT UR-MCNC: 173.5 MG/DL
DEPRECATED RDW RBC AUTO: 42.6 FL (ref 37–54)
EGFRCR SERPLBLD CKD-EPI 2021: 118.4 ML/MIN/1.73
EOSINOPHIL # BLD AUTO: 0.03 10*3/MM3 (ref 0–0.4)
EOSINOPHIL NFR BLD AUTO: 0.4 % (ref 0.3–6.2)
ERYTHROCYTE [DISTWIDTH] IN BLOOD BY AUTOMATED COUNT: 13.1 % (ref 12.3–15.4)
GLOBULIN UR ELPH-MCNC: 3.4 GM/DL
GLUCOSE SERPL-MCNC: 108 MG/DL (ref 65–99)
GLUCOSE UR STRIP-MCNC: NEGATIVE MG/DL
HCT VFR BLD AUTO: 31.9 % (ref 34–46.6)
HGB BLD-MCNC: 10.6 G/DL (ref 12–15.9)
HGB UR QL STRIP.AUTO: NEGATIVE
HYALINE CASTS UR QL AUTO: ABNORMAL /LPF
IMM GRANULOCYTES # BLD AUTO: 0.04 10*3/MM3 (ref 0–0.05)
IMM GRANULOCYTES NFR BLD AUTO: 0.5 % (ref 0–0.5)
KETONES UR QL STRIP: ABNORMAL
LEUKOCYTE ESTERASE UR QL STRIP.AUTO: ABNORMAL
LYMPHOCYTES # BLD AUTO: 1.33 10*3/MM3 (ref 0.7–3.1)
LYMPHOCYTES NFR BLD AUTO: 17.8 % (ref 19.6–45.3)
MCH RBC QN AUTO: 29.4 PG (ref 26.6–33)
MCHC RBC AUTO-ENTMCNC: 33.2 G/DL (ref 31.5–35.7)
MCV RBC AUTO: 88.6 FL (ref 79–97)
MONOCYTES # BLD AUTO: 0.31 10*3/MM3 (ref 0.1–0.9)
MONOCYTES NFR BLD AUTO: 4.1 % (ref 5–12)
NEUTROPHILS NFR BLD AUTO: 5.75 10*3/MM3 (ref 1.7–7)
NEUTROPHILS NFR BLD AUTO: 77.1 % (ref 42.7–76)
NITRITE UR QL STRIP: NEGATIVE
NRBC BLD AUTO-RTO: 0 /100 WBC (ref 0–0.2)
PH UR STRIP.AUTO: 7 [PH] (ref 5–8)
PLATELET # BLD AUTO: 271 10*3/MM3 (ref 140–450)
PMV BLD AUTO: 11.7 FL (ref 6–12)
POTASSIUM SERPL-SCNC: 3.9 MMOL/L (ref 3.5–5.2)
PROT ?TM UR-MCNC: 35.3 MG/DL
PROT SERPL-MCNC: 6.7 G/DL (ref 6–8.5)
PROT UR QL STRIP: ABNORMAL
PROT/CREAT UR: 203.5 MG/G CREA (ref 0–200)
RBC # BLD AUTO: 3.6 10*6/MM3 (ref 3.77–5.28)
RBC # UR STRIP: ABNORMAL /HPF
REF LAB TEST METHOD: ABNORMAL
SODIUM SERPL-SCNC: 135 MMOL/L (ref 136–145)
SP GR UR STRIP: 1.02 (ref 1–1.03)
SQUAMOUS #/AREA URNS HPF: ABNORMAL /HPF
UROBILINOGEN UR QL STRIP: ABNORMAL
WBC # UR STRIP: ABNORMAL /HPF
WBC NRBC COR # BLD AUTO: 7.47 10*3/MM3 (ref 3.4–10.8)

## 2024-10-06 PROCEDURE — 59025 FETAL NON-STRESS TEST: CPT

## 2024-10-06 PROCEDURE — 84156 ASSAY OF PROTEIN URINE: CPT | Performed by: OBSTETRICS & GYNECOLOGY

## 2024-10-06 PROCEDURE — 80053 COMPREHEN METABOLIC PANEL: CPT | Performed by: OBSTETRICS & GYNECOLOGY

## 2024-10-06 PROCEDURE — 85025 COMPLETE CBC W/AUTO DIFF WBC: CPT | Performed by: OBSTETRICS & GYNECOLOGY

## 2024-10-06 PROCEDURE — 87086 URINE CULTURE/COLONY COUNT: CPT | Performed by: OBSTETRICS & GYNECOLOGY

## 2024-10-06 PROCEDURE — 81001 URINALYSIS AUTO W/SCOPE: CPT | Performed by: OBSTETRICS & GYNECOLOGY

## 2024-10-06 PROCEDURE — 82570 ASSAY OF URINE CREATININE: CPT | Performed by: OBSTETRICS & GYNECOLOGY

## 2024-10-06 PROCEDURE — 99284 EMERGENCY DEPT VISIT MOD MDM: CPT | Performed by: OBSTETRICS & GYNECOLOGY

## 2024-10-06 NOTE — OBED NOTES
[facility]  Reed Xavier  : 1990  MRN: [id number]  CSN: [id number]    OB ED Provider Note    Subjective   Chief Complaint   Patient presents with    Decreased Fetal Movement     KETURAH-patient came in with complaints of decreased fetal movement since last night. Patient denies VB, LOF, and contractions.     Reed Xavier is a 33 y.o. year old  with an Estimated Date of Delivery: 10/20/24 currently at 38w0d presenting with decreased FM since last night. She has noted movement, just not as strongly, although she is noting vigorous FM in the KETURAH. She is reporting irregular CTX. No ROM , VB, HA, visual change are present.     Prenatal care has been with Dr. Adames.  It has been complicated by planned primary  due to difficult delivery with first baby .    OB History    Para Term  AB Living   3 1 1 0 1 1   SAB IAB Ectopic Molar Multiple Live Births   0 0 0 0 0 1      # Outcome Date GA Lbr Casa/2nd Weight Sex Type Anes PTL Lv   3 Current            2 Term 22 39w2d / 03:30 3175 g (7 lb) M Vag-Vacuum EPI N ROSANGELA      Birth Comments: Panda LDR 5      Complications: Fetal Intolerance      Name: ESTELLE XAVIER      Apgar1: 8  Apgar5: 9   1 AB 21 5w0d             Obstetric Comments   Delivery required vacuum assist- second degree tear, repair. + completed pelvic floor PT     Past Medical History:   Diagnosis Date    Anxiety     Disease of thyroid gland      Past Surgical History:   Procedure Laterality Date    WISDOM TOOTH EXTRACTION Bilateral     DENTAL PROCEDURE      WISDOM TOOTH EXTRACTION Bilateral          No current facility-administered medications for this encounter.    Allergies   Allergen Reactions    Phenergan [Promethazine] Other (See Comments)     Ticks/tremors    Sulfa Antibiotics Rash     Last exposure 2017     Social History    Tobacco Use      Smoking status: Never        Passive exposure: Never      Smokeless tobacco: Never    Review of  "Systems   Gastrointestinal:  Positive for abdominal pain.   All other systems reviewed and are negative.        Objective   /92   Pulse 107   Temp 98.2 °F (36.8 °C) (Oral)   Resp 14   Ht 167.6 cm (66\")   Wt 94.8 kg (209 lb)   SpO2 97%   BMI 33.73 kg/m²   General: well developed; well nourished  no acute distress  mentation appropriate   Abdomen: soft, non-tender; no masses  gravid    FHT's: reactive and category 1      Cervix: was checked (by RN): 3.5 cm / 70 % / -2 unchanged after 1 hour   Presentation: cephalic   Contractions: irregular   Chest: Unlabored respirations    CV:  Mild tachycardia, RR   Ext:   No C/C/E   Back: CVA tenderness is deferred bilateral        Prenatal Labs  Lab Results   Component Value Date    HGB 10.6 (L) 10/06/2024    RUBELLAABIGG Immune 02/02/2022    HEPBSAG Negative 02/02/2022    ABSCRN Negative 09/12/2022    [id number] Non-Reactive 02/02/2022    HEPCVIRUSABY Negative 02/02/2022    STREPGPB NEG 09/23/2024       Current Labs Reviewed   CBC w/ diff:   Lab Results   Component Value Date    WBC 7.47 10/06/2024    NEUTRORELPCT 77.1 (H) 10/06/2024    AUTOIGPER 0.5 10/06/2024    LYMPHORELPCT 17.8 (L) 10/06/2024    MONORELPCT 4.1 (L) 10/06/2024    EOSRELPCT 0.4 10/06/2024    BASORELPCT 0.1 10/06/2024    HGB 10.6 (L) 10/06/2024    HCT 31.9 (L) 10/06/2024    MCV 88.6 10/06/2024    RDW 13.1 10/06/2024     10/06/2024     CMP:   Lab Results   Component Value Date     (L) 10/06/2024    K 3.9 10/06/2024     10/06/2024    CO2 15.6 (L) 10/06/2024    BUN 6 10/06/2024    CREATININE 0.69 10/06/2024    GLUCOSE 108 (H) 10/06/2024    ALBUMIN 3.3 (L) 10/06/2024    CALCIUM 9.0 10/06/2024    AST 23 10/06/2024    ALT 14 10/06/2024    BILITOT <0.2 10/06/2024     UA:    Lab Results   Component Value Date    SQUAMEPIUA 7-12 (A) 10/06/2024    SPECGRAVUR 1.020 10/06/2024    KETONESU Trace (A) 10/06/2024    BLOODU Negative 10/06/2024    LEUKOCYTESUR Large (3+) (A) 10/06/2024    NITRITEU " Negative 10/06/2024    RBCUA 0-2 10/06/2024    WBCUA 21-50 (A) 10/06/2024    BACTERIA 1+ (A) 10/06/2024     Urine .5     Assessment   IUP at 38w0d  Decreased FM- resolved. Cat I NST  False labor > 37 weeks  Gestational HTN- does not meet criteria for preeclampsia. She has an appointment tomorrow for u/s and delivery planning       Plan   D/C home with preeclampsia and labor precautions, [facility]. Return for worsening symptoms, acute changes. Keep appt tomorrow.     Luke Brunner MD  10/6/2024  09:54 EDT

## 2024-10-06 NOTE — NURSING NOTE
Patient discharged with verbal and written instructions on when to return to the hospital, urgent maternal warning signs, DFM, VB, contractions, and LOF. Patient verbalized understanding.

## 2024-10-07 LAB — BACTERIA SPEC AEROBE CULT: NORMAL

## 2024-10-13 ENCOUNTER — ANESTHESIA (OUTPATIENT)
Dept: LABOR AND DELIVERY | Facility: HOSPITAL | Age: 34
End: 2024-10-13
Payer: COMMERCIAL

## 2024-10-13 ENCOUNTER — ANESTHESIA EVENT (OUTPATIENT)
Dept: LABOR AND DELIVERY | Facility: HOSPITAL | Age: 34
End: 2024-10-13
Payer: COMMERCIAL

## 2024-10-13 ENCOUNTER — HOSPITAL ENCOUNTER (INPATIENT)
Facility: HOSPITAL | Age: 34
LOS: 2 days | Discharge: HOME | End: 2024-10-15
Attending: OBSTETRICS & GYNECOLOGY | Admitting: OBSTETRICS & GYNECOLOGY
Payer: COMMERCIAL

## 2024-10-13 PROBLEM — Z34.90 PREGNANCY: Status: ACTIVE | Noted: 2024-10-13

## 2024-10-13 LAB
ABO GROUP BLD: NORMAL
ALBUMIN SERPL-MCNC: 3.3 G/DL (ref 3.5–5.2)
ALBUMIN/GLOB SERPL: 1.1 G/DL
ALP SERPL-CCNC: 171 U/L (ref 39–117)
ALT SERPL W P-5'-P-CCNC: 14 U/L (ref 1–33)
ANION GAP SERPL CALCULATED.3IONS-SCNC: 13.2 MMOL/L (ref 5–15)
AST SERPL-CCNC: 22 U/L (ref 1–32)
BASOPHILS # BLD AUTO: 0.01 10*3/MM3 (ref 0–0.2)
BASOPHILS NFR BLD AUTO: 0.1 % (ref 0–1.5)
BILIRUB SERPL-MCNC: <0.2 MG/DL (ref 0–1.2)
BLD GP AB SCN SERPL QL: NEGATIVE
BUN SERPL-MCNC: 7 MG/DL (ref 6–20)
BUN/CREAT SERPL: 10.3 (ref 7–25)
CALCIUM SPEC-SCNC: 9.1 MG/DL (ref 8.6–10.5)
CHLORIDE SERPL-SCNC: 106 MMOL/L (ref 98–107)
CO2 SERPL-SCNC: 18.8 MMOL/L (ref 22–29)
CREAT SERPL-MCNC: 0.68 MG/DL (ref 0.57–1)
DEPRECATED RDW RBC AUTO: 42.3 FL (ref 37–54)
EGFRCR SERPLBLD CKD-EPI 2021: 118.8 ML/MIN/1.73
EOSINOPHIL # BLD AUTO: 0.04 10*3/MM3 (ref 0–0.4)
EOSINOPHIL NFR BLD AUTO: 0.5 % (ref 0.3–6.2)
ERYTHROCYTE [DISTWIDTH] IN BLOOD BY AUTOMATED COUNT: 13.4 % (ref 12.3–15.4)
GLOBULIN UR ELPH-MCNC: 2.9 GM/DL
GLUCOSE SERPL-MCNC: 98 MG/DL (ref 65–99)
HCT VFR BLD AUTO: 30.2 % (ref 34–46.6)
HGB BLD-MCNC: 9.9 G/DL (ref 12–15.9)
IMM GRANULOCYTES # BLD AUTO: 0.04 10*3/MM3 (ref 0–0.05)
IMM GRANULOCYTES NFR BLD AUTO: 0.5 % (ref 0–0.5)
LYMPHOCYTES # BLD AUTO: 1.69 10*3/MM3 (ref 0.7–3.1)
LYMPHOCYTES NFR BLD AUTO: 19.2 % (ref 19.6–45.3)
MCH RBC QN AUTO: 28.7 PG (ref 26.6–33)
MCHC RBC AUTO-ENTMCNC: 32.8 G/DL (ref 31.5–35.7)
MCV RBC AUTO: 87.5 FL (ref 79–97)
MONOCYTES # BLD AUTO: 0.47 10*3/MM3 (ref 0.1–0.9)
MONOCYTES NFR BLD AUTO: 5.3 % (ref 5–12)
NEUTROPHILS NFR BLD AUTO: 6.56 10*3/MM3 (ref 1.7–7)
NEUTROPHILS NFR BLD AUTO: 74.4 % (ref 42.7–76)
NITRAZINE EXPIRATION DATE: NORMAL
NITRAZINE LOT NUMBER: NORMAL
NRBC BLD AUTO-RTO: 0 /100 WBC (ref 0–0.2)
PH FLD: 7.5 [PH]
PLATELET # BLD AUTO: 265 10*3/MM3 (ref 140–450)
PMV BLD AUTO: 11.5 FL (ref 6–12)
POTASSIUM SERPL-SCNC: 4 MMOL/L (ref 3.5–5.2)
PROT SERPL-MCNC: 6.2 G/DL (ref 6–8.5)
RBC # BLD AUTO: 3.45 10*6/MM3 (ref 3.77–5.28)
RH BLD: POSITIVE
SODIUM SERPL-SCNC: 138 MMOL/L (ref 136–145)
T&S EXPIRATION DATE: NORMAL
TREPONEMA PALLIDUM IGG+IGM AB [PRESENCE] IN SERUM OR PLASMA BY IMMUNOASSAY: NORMAL
WBC NRBC COR # BLD AUTO: 8.81 10*3/MM3 (ref 3.4–10.8)

## 2024-10-13 PROCEDURE — 85025 COMPLETE CBC W/AUTO DIFF WBC: CPT | Performed by: OBSTETRICS & GYNECOLOGY

## 2024-10-13 PROCEDURE — 25810000003 LACTATED RINGERS SOLUTION: Performed by: ANESTHESIOLOGY

## 2024-10-13 PROCEDURE — 86900 BLOOD TYPING SEROLOGIC ABO: CPT | Performed by: OBSTETRICS & GYNECOLOGY

## 2024-10-13 PROCEDURE — C1755 CATHETER, INTRASPINAL: HCPCS | Performed by: ANESTHESIOLOGY

## 2024-10-13 PROCEDURE — 25010000002 LIDOCAINE-EPINEPHRINE 1 %-1:200000 SOLUTION: Performed by: ANESTHESIOLOGY

## 2024-10-13 PROCEDURE — 80053 COMPREHEN METABOLIC PANEL: CPT | Performed by: OBSTETRICS & GYNECOLOGY

## 2024-10-13 PROCEDURE — 86901 BLOOD TYPING SEROLOGIC RH(D): CPT | Performed by: OBSTETRICS & GYNECOLOGY

## 2024-10-13 PROCEDURE — 25810000003 LACTATED RINGERS SOLUTION: Performed by: OBSTETRICS & GYNECOLOGY

## 2024-10-13 PROCEDURE — 86780 TREPONEMA PALLIDUM: CPT | Performed by: OBSTETRICS & GYNECOLOGY

## 2024-10-13 PROCEDURE — 99202 OFFICE O/P NEW SF 15 MIN: CPT | Performed by: OBSTETRICS & GYNECOLOGY

## 2024-10-13 PROCEDURE — 86850 RBC ANTIBODY SCREEN: CPT | Performed by: OBSTETRICS & GYNECOLOGY

## 2024-10-13 PROCEDURE — 88307 TISSUE EXAM BY PATHOLOGIST: CPT

## 2024-10-13 RX ORDER — OXYTOCIN/0.9 % SODIUM CHLORIDE 30/500 ML
999 PLASTIC BAG, INJECTION (ML) INTRAVENOUS ONCE
Status: COMPLETED | OUTPATIENT
Start: 2024-10-13 | End: 2024-10-13

## 2024-10-13 RX ORDER — ONDANSETRON 2 MG/ML
4 INJECTION INTRAMUSCULAR; INTRAVENOUS EVERY 6 HOURS PRN
Status: DISCONTINUED | OUTPATIENT
Start: 2024-10-13 | End: 2024-10-15 | Stop reason: HOSPADM

## 2024-10-13 RX ORDER — FENTANYL/ROPIVACAINE/NS/PF 2MCG/ML-.2
10 PLASTIC BAG, INJECTION (ML) INJECTION CONTINUOUS
Status: DISCONTINUED | OUTPATIENT
Start: 2024-10-13 | End: 2024-10-15 | Stop reason: HOSPADM

## 2024-10-13 RX ORDER — LIDOCAINE HYDROCHLORIDE 10 MG/ML
0.5 INJECTION, SOLUTION INFILTRATION; PERINEURAL ONCE AS NEEDED
Status: DISCONTINUED | OUTPATIENT
Start: 2024-10-13 | End: 2024-10-14 | Stop reason: HOSPADM

## 2024-10-13 RX ORDER — CITRIC ACID/SODIUM CITRATE 334-500MG
30 SOLUTION, ORAL ORAL ONCE
Status: CANCELLED | OUTPATIENT
Start: 2024-10-13 | End: 2024-10-13

## 2024-10-13 RX ORDER — ONDANSETRON 2 MG/ML
4 INJECTION INTRAMUSCULAR; INTRAVENOUS ONCE AS NEEDED
Status: CANCELLED | OUTPATIENT
Start: 2024-10-13

## 2024-10-13 RX ORDER — ACETAMINOPHEN 500 MG
1000 TABLET ORAL ONCE
Status: DISCONTINUED | OUTPATIENT
Start: 2024-10-13 | End: 2024-10-14 | Stop reason: HOSPADM

## 2024-10-13 RX ORDER — EPHEDRINE SULFATE 50 MG/ML
5 INJECTION, SOLUTION INTRAVENOUS AS NEEDED
Status: DISCONTINUED | OUTPATIENT
Start: 2024-10-13 | End: 2024-10-14 | Stop reason: HOSPADM

## 2024-10-13 RX ORDER — FAMOTIDINE 10 MG/ML
20 INJECTION, SOLUTION INTRAVENOUS ONCE AS NEEDED
Status: DISCONTINUED | OUTPATIENT
Start: 2024-10-13 | End: 2024-10-14 | Stop reason: HOSPADM

## 2024-10-13 RX ORDER — SODIUM CHLORIDE, SODIUM LACTATE, POTASSIUM CHLORIDE, CALCIUM CHLORIDE 600; 310; 30; 20 MG/100ML; MG/100ML; MG/100ML; MG/100ML
125 INJECTION, SOLUTION INTRAVENOUS CONTINUOUS
Status: DISCONTINUED | OUTPATIENT
Start: 2024-10-13 | End: 2024-10-15

## 2024-10-13 RX ORDER — OXYTOCIN/0.9 % SODIUM CHLORIDE 30/500 ML
125 PLASTIC BAG, INJECTION (ML) INTRAVENOUS ONCE AS NEEDED
Status: COMPLETED | OUTPATIENT
Start: 2024-10-13 | End: 2024-10-13

## 2024-10-13 RX ORDER — ONDANSETRON 2 MG/ML
4 INJECTION INTRAMUSCULAR; INTRAVENOUS ONCE AS NEEDED
Status: DISCONTINUED | OUTPATIENT
Start: 2024-10-13 | End: 2024-10-14 | Stop reason: HOSPADM

## 2024-10-13 RX ORDER — CITRIC ACID/SODIUM CITRATE 334-500MG
30 SOLUTION, ORAL ORAL ONCE
Status: DISCONTINUED | OUTPATIENT
Start: 2024-10-13 | End: 2024-10-13

## 2024-10-13 RX ORDER — OXYTOCIN/0.9 % SODIUM CHLORIDE 30/500 ML
250 PLASTIC BAG, INJECTION (ML) INTRAVENOUS CONTINUOUS
Status: DISPENSED | OUTPATIENT
Start: 2024-10-13 | End: 2024-10-13

## 2024-10-13 RX ORDER — DIPHENHYDRAMINE HYDROCHLORIDE 50 MG/ML
12.5 INJECTION INTRAMUSCULAR; INTRAVENOUS EVERY 8 HOURS PRN
Status: DISCONTINUED | OUTPATIENT
Start: 2024-10-13 | End: 2024-10-14 | Stop reason: HOSPADM

## 2024-10-13 RX ORDER — METHYLERGONOVINE MALEATE 0.2 MG/ML
200 INJECTION INTRAVENOUS ONCE AS NEEDED
Status: DISCONTINUED | OUTPATIENT
Start: 2024-10-13 | End: 2024-10-14 | Stop reason: HOSPADM

## 2024-10-13 RX ORDER — CARBOPROST TROMETHAMINE 250 UG/ML
250 INJECTION, SOLUTION INTRAMUSCULAR
Status: DISCONTINUED | OUTPATIENT
Start: 2024-10-13 | End: 2024-10-14 | Stop reason: HOSPADM

## 2024-10-13 RX ORDER — FAMOTIDINE 10 MG/ML
20 INJECTION, SOLUTION INTRAVENOUS EVERY 12 HOURS SCHEDULED
Status: DISCONTINUED | OUTPATIENT
Start: 2024-10-13 | End: 2024-10-15 | Stop reason: HOSPADM

## 2024-10-13 RX ORDER — SODIUM CHLORIDE 0.9 % (FLUSH) 0.9 %
10 SYRINGE (ML) INJECTION AS NEEDED
Status: DISCONTINUED | OUTPATIENT
Start: 2024-10-13 | End: 2024-10-14 | Stop reason: HOSPADM

## 2024-10-13 RX ORDER — MISOPROSTOL 200 UG/1
800 TABLET ORAL ONCE AS NEEDED
Status: DISCONTINUED | OUTPATIENT
Start: 2024-10-13 | End: 2024-10-14 | Stop reason: HOSPADM

## 2024-10-13 RX ORDER — TRANEXAMIC ACID 10 MG/ML
1000 INJECTION, SOLUTION INTRAVENOUS ONCE AS NEEDED
Status: DISCONTINUED | OUTPATIENT
Start: 2024-10-13 | End: 2024-10-14 | Stop reason: HOSPADM

## 2024-10-13 RX ORDER — EPHEDRINE SULFATE 50 MG/ML
10 INJECTION, SOLUTION INTRAVENOUS
Status: DISCONTINUED | OUTPATIENT
Start: 2024-10-13 | End: 2024-10-14 | Stop reason: HOSPADM

## 2024-10-13 RX ORDER — SODIUM CHLORIDE 0.9 % (FLUSH) 0.9 %
10 SYRINGE (ML) INJECTION EVERY 12 HOURS SCHEDULED
Status: DISCONTINUED | OUTPATIENT
Start: 2024-10-13 | End: 2024-10-14 | Stop reason: HOSPADM

## 2024-10-13 RX ORDER — FAMOTIDINE 10 MG/ML
20 INJECTION, SOLUTION INTRAVENOUS ONCE AS NEEDED
Status: CANCELLED | OUTPATIENT
Start: 2024-10-13

## 2024-10-13 RX ORDER — KETOROLAC TROMETHAMINE 30 MG/ML
30 INJECTION, SOLUTION INTRAMUSCULAR; INTRAVENOUS ONCE
Status: DISCONTINUED | OUTPATIENT
Start: 2024-10-13 | End: 2024-10-14 | Stop reason: HOSPADM

## 2024-10-13 RX ADMIN — Medication 125 ML/HR: at 23:26

## 2024-10-13 RX ADMIN — SODIUM CHLORIDE, POTASSIUM CHLORIDE, SODIUM LACTATE AND CALCIUM CHLORIDE 1000 ML: 600; 310; 30; 20 INJECTION, SOLUTION INTRAVENOUS at 20:27

## 2024-10-13 RX ADMIN — SODIUM CHLORIDE, POTASSIUM CHLORIDE, SODIUM LACTATE AND CALCIUM CHLORIDE 1000 ML: 600; 310; 30; 20 INJECTION, SOLUTION INTRAVENOUS at 20:45

## 2024-10-13 RX ADMIN — LIDOCAINE HYDROCHLORIDE 4 ML: 10; .005 INJECTION, SOLUTION EPIDURAL; INFILTRATION; INTRACAUDAL; PERINEURAL at 21:42

## 2024-10-13 RX ADMIN — Medication 999 ML/HR: at 22:11

## 2024-10-13 RX ADMIN — LIDOCAINE HYDROCHLORIDE 3 ML: 10; .005 INJECTION, SOLUTION EPIDURAL; INFILTRATION; INTRACAUDAL; PERINEURAL at 21:47

## 2024-10-13 RX ADMIN — LIDOCAINE HYDROCHLORIDE 5 ML: 10; .005 INJECTION, SOLUTION EPIDURAL; INFILTRATION; INTRACAUDAL; PERINEURAL at 21:45

## 2024-10-13 RX ADMIN — Medication 10 ML/HR: at 21:54

## 2024-10-13 NOTE — OBED NOTES
KETURAH Note OB        Patient Name: Reed Xavier  YOB: 1990  MRN: [id number]  Admission Date: 10/13/2024  7:12 PM  Date of Service: 10/13/2024    Chief Complaint: Rupture of Membranes (KETURAH with complaints of SROM at 615pm, light green in color, large amount.  Denies vaginal bleeding, + fetal movement.)        Subjective     Reed Xavier is a 33 y.o. female  at 39w0d with Estimated Date of Delivery: 10/20/24 who presents with the chief complaint listed above.  Membranes ruptured with light meconium at 1815 this evening.  She last ate at 1630.  She sees Madie Adames MD for her prenatal care. Her pregnancy has been complicated by:  planned primary  section due to history of difficult delivery.    She describes fetal movement as normal.  She admits to rupture of membranes.  She denies vaginal bleeding. She is not feeling contractions.    She denies headache, visual changes, chest pain, shortness of breath and RUQ pain.          Objective   Patient Active Problem List    Diagnosis     Other specified hypothyroidism [E03.8]     Anxiety [F41.9]     Family history of diabetes mellitus [Z83.3]     Family history of cardiovascular disease [Z82.49]     Seasonal allergies [J30.2]     History of abnormal cervical Pap smear [Z87.42]         OB History    Para Term  AB Living   3 1 1 0 1 1   SAB IAB Ectopic Molar Multiple Live Births   0 0 0 0 0 1      # Outcome Date GA Lbr Casa/2nd Weight Sex Type Anes PTL Lv   3 Current            2 Term 22 39w2d / 03:30 3175 g (7 lb) M Vag-Vacuum EPI N ROSANGELA      Birth Comments: Panda LDR 5      Complications: Fetal Intolerance      Name: ESTELLE XAVIER      Apgar1: 8  Apgar5: 9   1 AB 21 5w0d             Obstetric Comments   Delivery required vacuum assist- second degree tear, repair. + completed pelvic floor PT        Past Medical History:   Diagnosis Date    Anxiety     Disease of thyroid gland        Past Surgical  History:   Procedure Laterality Date    DENTAL PROCEDURE      WISDOM TOOTH EXTRACTION Bilateral 2009    WISDOM TOOTH EXTRACTION Bilateral     2009       No current facility-administered medications on file prior to encounter.     Current Outpatient Medications on File Prior to Encounter   Medication Sig Dispense Refill    cetirizine (zyrTEC) 10 MG tablet Take 1 tablet by mouth Daily.      escitalopram (LEXAPRO) 10 MG tablet Take 1 tablet by mouth Daily for 360 days. 90 tablet 3    levothyroxine (SYNTHROID, LEVOTHROID) 50 MCG tablet Take 1 tablet by mouth Every Morning for 360 days. (Patient taking differently: Take 1.5 tablets by mouth Every Morning.) 90 tablet 3    omeprazole (priLOSEC) 20 MG capsule Take 1 capsule by mouth Daily.      prenatal vitamin (prenatal, CLASSIC, vitamin) tablet Take  by mouth Daily.      omeprazole (priLOSEC) 20 MG capsule Take 1 capsule by mouth Daily.         Allergies   Allergen Reactions    Phenergan [Promethazine] Other (See Comments)     Ticks/tremors    Sulfa Antibiotics Rash     Last exposure        Family History   Problem Relation Age of Onset    Cancer Mother         dx age 54- stage 1 +ER breast cancer    Hashimoto's thyroiditis Mother     Hypertension Father     Anxiety disorder Sister     Inguinal hernia Son         repaired at 3 mo    Acute myelogenous leukemia Maternal Grandmother     Hypertension Maternal Grandmother     Atrial fibrillation Maternal Grandmother     Heart attack Maternal Grandfather     Diabetes type II Maternal Grandfather     Kidney cancer Paternal Grandmother     Diabetes type II Paternal Grandmother     Heart attack Paternal Grandfather          age 45       Social History     Socioeconomic History    Marital status:      Spouse name: Raoul   Tobacco Use    Smoking status: Never     Passive exposure: Never    Smokeless tobacco: Never   Vaping Use    Vaping status: Never Used   Substance and Sexual Activity    Alcohol use: Not Currently  "   Drug use: Never    Sexual activity: Yes     Partners: Male     Birth control/protection: Condom     Comment: Plan to [facility] Dec 2023           Review of Systems   Constitutional: Negative.    HENT: Negative.     Respiratory: Negative.     Cardiovascular: Negative.    Gastrointestinal: Negative.    Genitourinary:  Positive for vaginal discharge.   Neurological: Negative.           PHYSICAL EXAM:      VITAL SIGNS:  Vitals:    10/13/24 1913 10/13/24 193   BP:  142/86   BP Location:  Right arm   Patient Position:  Lying   Pulse:  87   Resp:  20   Temp:  98.6 °F (37 °C)   TempSrc:  Oral   Weight: 97.4 kg (214 lb 12.8 oz)    Height:  167.6 cm (66\")        FHT:                   Baseline:  140 BPM  Variability:  Moderate = 6 - 25 BPM  Accelerations:  15 x 15 accelerations present     Decelerations:  absent  Contractions:   present     Interpretation:    Reactive NST, CAT 1 tracing        PHYSICAL EXAM:    General: well developed; well nourished  no acute distress  mentation appropriate   Heart: regular rate and rhythm, S1, S2 normal, no murmur, click, rub or gallop   Lungs  : breathing is unlabored  clear to auscultation bilaterally     Abdomen: soft, non-tender; no masses       Cervix: was not checked.      Contractions: rare        Extremities: trace LE edema      LABS AND TESTING ORDERED:  Uterine and fetal monitoring  Urinalysis      LAB RESULTS:    No results found for this or any previous visit (from the past 24 hours).    Lab Results   Component Value Date    ABO O 2022    RH Positive 2022       Lab Results   Component Value Date    STREPGPB NEG 2024                 Assessment & Plan     ASSESSMENT/PLAN:  Reed Xavier is a 33 y.o. female  at 39w0d who presented with:     PROM with meconium  Gestational hypertension vs preeclampsia  History of difficult vaginal delivery        PLAN:     Admit for delivery by primary  section.  Patient's blood pressure was mildly elevated in KETURAH " one week ago but normal blood pressure in office.  HELLP labs were unremarkable last week.    On-call physician has been notified by RN    I have spent 15 minutes including face to face time with the patient, greater than 50% in discussion of the diagnosis (counseling) and/or coordination of care.     Kathrine Sheppard MD  10/13/2024  19:51 EDT  OB Hospitalist  Phone:  [phone]

## 2024-10-14 LAB
BASOPHILS # BLD AUTO: 0.01 10*3/MM3 (ref 0–0.2)
BASOPHILS NFR BLD AUTO: 0.1 % (ref 0–1.5)
DEPRECATED RDW RBC AUTO: 41.6 FL (ref 37–54)
EOSINOPHIL # BLD AUTO: 0.01 10*3/MM3 (ref 0–0.4)
EOSINOPHIL NFR BLD AUTO: 0.1 % (ref 0.3–6.2)
ERYTHROCYTE [DISTWIDTH] IN BLOOD BY AUTOMATED COUNT: 13.5 % (ref 12.3–15.4)
HCT VFR BLD AUTO: 30.1 % (ref 34–46.6)
HGB BLD-MCNC: 9.8 G/DL (ref 12–15.9)
IMM GRANULOCYTES # BLD AUTO: 0.06 10*3/MM3 (ref 0–0.05)
IMM GRANULOCYTES NFR BLD AUTO: 0.5 % (ref 0–0.5)
LYMPHOCYTES # BLD AUTO: 1.4 10*3/MM3 (ref 0.7–3.1)
LYMPHOCYTES NFR BLD AUTO: 10.7 % (ref 19.6–45.3)
MCH RBC QN AUTO: 28.4 PG (ref 26.6–33)
MCHC RBC AUTO-ENTMCNC: 32.6 G/DL (ref 31.5–35.7)
MCV RBC AUTO: 87.2 FL (ref 79–97)
MONOCYTES # BLD AUTO: 0.5 10*3/MM3 (ref 0.1–0.9)
MONOCYTES NFR BLD AUTO: 3.8 % (ref 5–12)
NEUTROPHILS NFR BLD AUTO: 11.06 10*3/MM3 (ref 1.7–7)
NEUTROPHILS NFR BLD AUTO: 84.8 % (ref 42.7–76)
NRBC BLD AUTO-RTO: 0 /100 WBC (ref 0–0.2)
PLATELET # BLD AUTO: 237 10*3/MM3 (ref 140–450)
PMV BLD AUTO: 12 FL (ref 6–12)
RBC # BLD AUTO: 3.45 10*6/MM3 (ref 3.77–5.28)
WBC NRBC COR # BLD AUTO: 13.04 10*3/MM3 (ref 3.4–10.8)

## 2024-10-14 PROCEDURE — 85025 COMPLETE CBC W/AUTO DIFF WBC: CPT | Performed by: OBSTETRICS & GYNECOLOGY

## 2024-10-14 RX ORDER — CALCIUM CARBONATE 500 MG/1
2 TABLET, CHEWABLE ORAL 3 TIMES DAILY PRN
Status: CANCELLED | OUTPATIENT
Start: 2024-10-14

## 2024-10-14 RX ORDER — TRAMADOL HYDROCHLORIDE 50 MG/1
50 TABLET ORAL EVERY 6 HOURS PRN
Status: DISCONTINUED | OUTPATIENT
Start: 2024-10-14 | End: 2024-10-15 | Stop reason: HOSPADM

## 2024-10-14 RX ORDER — OXYTOCIN/0.9 % SODIUM CHLORIDE 30/500 ML
125 PLASTIC BAG, INJECTION (ML) INTRAVENOUS CONTINUOUS PRN
Status: CANCELLED | OUTPATIENT
Start: 2024-10-14

## 2024-10-14 RX ORDER — ACETAMINOPHEN 325 MG/1
650 TABLET ORAL EVERY 6 HOURS PRN
Status: CANCELLED | OUTPATIENT
Start: 2024-10-14

## 2024-10-14 RX ORDER — HYDROCORTISONE 25 MG/G
CREAM TOPICAL AS NEEDED
Status: CANCELLED | OUTPATIENT
Start: 2024-10-14

## 2024-10-14 RX ORDER — PRENATAL VIT/IRON FUM/FOLIC AC 27MG-0.8MG
1 TABLET ORAL DAILY
Status: DISCONTINUED | OUTPATIENT
Start: 2024-10-14 | End: 2024-10-15 | Stop reason: HOSPADM

## 2024-10-14 RX ORDER — MISOPROSTOL 200 UG/1
600 TABLET ORAL ONCE AS NEEDED
Status: CANCELLED | OUTPATIENT
Start: 2024-10-14

## 2024-10-14 RX ORDER — IBUPROFEN 600 MG/1
600 TABLET, FILM COATED ORAL EVERY 6 HOURS PRN
Status: CANCELLED | OUTPATIENT
Start: 2024-10-14

## 2024-10-14 RX ORDER — OXYTOCIN/0.9 % SODIUM CHLORIDE 30/500 ML
125 PLASTIC BAG, INJECTION (ML) INTRAVENOUS CONTINUOUS PRN
Status: DISCONTINUED | OUTPATIENT
Start: 2024-10-14 | End: 2024-10-15 | Stop reason: HOSPADM

## 2024-10-14 RX ORDER — PROMETHAZINE HYDROCHLORIDE 25 MG/1
25 TABLET ORAL EVERY 6 HOURS PRN
Status: CANCELLED | OUTPATIENT
Start: 2024-10-14

## 2024-10-14 RX ORDER — ONDANSETRON 2 MG/ML
4 INJECTION INTRAMUSCULAR; INTRAVENOUS EVERY 6 HOURS PRN
Status: DISCONTINUED | OUTPATIENT
Start: 2024-10-14 | End: 2024-10-15 | Stop reason: HOSPADM

## 2024-10-14 RX ORDER — MISOPROSTOL 200 UG/1
600 TABLET ORAL ONCE AS NEEDED
Status: DISCONTINUED | OUTPATIENT
Start: 2024-10-14 | End: 2024-10-15 | Stop reason: HOSPADM

## 2024-10-14 RX ORDER — ONDANSETRON 4 MG/1
4 TABLET, ORALLY DISINTEGRATING ORAL EVERY 8 HOURS PRN
Status: DISCONTINUED | OUTPATIENT
Start: 2024-10-14 | End: 2024-10-15 | Stop reason: HOSPADM

## 2024-10-14 RX ORDER — BISACODYL 10 MG
10 SUPPOSITORY, RECTAL RECTAL DAILY PRN
Status: DISCONTINUED | OUTPATIENT
Start: 2024-10-15 | End: 2024-10-15 | Stop reason: HOSPADM

## 2024-10-14 RX ORDER — TRANEXAMIC ACID 10 MG/ML
1000 INJECTION, SOLUTION INTRAVENOUS ONCE AS NEEDED
Status: DISCONTINUED | OUTPATIENT
Start: 2024-10-14 | End: 2024-10-15 | Stop reason: HOSPADM

## 2024-10-14 RX ORDER — KETOROLAC TROMETHAMINE 15 MG/ML
15 INJECTION, SOLUTION INTRAMUSCULAR; INTRAVENOUS EVERY 6 HOURS PRN
Status: CANCELLED | OUTPATIENT
Start: 2024-10-14 | End: 2024-10-14

## 2024-10-14 RX ORDER — ACETAMINOPHEN 325 MG/1
650 TABLET ORAL EVERY 6 HOURS PRN
Status: DISCONTINUED | OUTPATIENT
Start: 2024-10-14 | End: 2024-10-15 | Stop reason: HOSPADM

## 2024-10-14 RX ORDER — BISACODYL 10 MG
10 SUPPOSITORY, RECTAL RECTAL DAILY PRN
Status: CANCELLED | OUTPATIENT
Start: 2024-10-15

## 2024-10-14 RX ORDER — ONDANSETRON 2 MG/ML
4 INJECTION INTRAMUSCULAR; INTRAVENOUS EVERY 6 HOURS PRN
Status: CANCELLED | OUTPATIENT
Start: 2024-10-14

## 2024-10-14 RX ORDER — HYDROCORTISONE 25 MG/G
CREAM TOPICAL AS NEEDED
Status: DISCONTINUED | OUTPATIENT
Start: 2024-10-14 | End: 2024-10-15 | Stop reason: HOSPADM

## 2024-10-14 RX ORDER — KETOROLAC TROMETHAMINE 15 MG/ML
15 INJECTION, SOLUTION INTRAMUSCULAR; INTRAVENOUS EVERY 6 HOURS PRN
Status: ACTIVE | OUTPATIENT
Start: 2024-10-14 | End: 2024-10-14

## 2024-10-14 RX ORDER — IBUPROFEN 600 MG/1
600 TABLET, FILM COATED ORAL EVERY 6 HOURS PRN
Status: DISCONTINUED | OUTPATIENT
Start: 2024-10-14 | End: 2024-10-15 | Stop reason: HOSPADM

## 2024-10-14 RX ORDER — TRAMADOL HYDROCHLORIDE 50 MG/1
50 TABLET ORAL EVERY 6 HOURS PRN
Status: CANCELLED | OUTPATIENT
Start: 2024-10-14 | End: 2024-10-21

## 2024-10-14 RX ORDER — CALCIUM CARBONATE 500 MG/1
2 TABLET, CHEWABLE ORAL 3 TIMES DAILY PRN
Status: DISCONTINUED | OUTPATIENT
Start: 2024-10-14 | End: 2024-10-15 | Stop reason: HOSPADM

## 2024-10-14 RX ORDER — DOCUSATE SODIUM 100 MG/1
100 CAPSULE, LIQUID FILLED ORAL 2 TIMES DAILY
Status: DISCONTINUED | OUTPATIENT
Start: 2024-10-14 | End: 2024-10-15 | Stop reason: HOSPADM

## 2024-10-14 RX ORDER — ESCITALOPRAM OXALATE 10 MG/1
10 TABLET ORAL DAILY
Status: DISCONTINUED | OUTPATIENT
Start: 2024-10-14 | End: 2024-10-15 | Stop reason: HOSPADM

## 2024-10-14 RX ORDER — HYDROXYZINE HYDROCHLORIDE 50 MG/1
50 TABLET, FILM COATED ORAL NIGHTLY PRN
Status: DISCONTINUED | OUTPATIENT
Start: 2024-10-14 | End: 2024-10-15 | Stop reason: HOSPADM

## 2024-10-14 RX ORDER — METHYLERGONOVINE MALEATE 0.2 MG/ML
200 INJECTION INTRAVENOUS ONCE AS NEEDED
Status: CANCELLED | OUTPATIENT
Start: 2024-10-14

## 2024-10-14 RX ORDER — DOCUSATE SODIUM 100 MG/1
100 CAPSULE, LIQUID FILLED ORAL 2 TIMES DAILY
Status: CANCELLED | OUTPATIENT
Start: 2024-10-14

## 2024-10-14 RX ORDER — PROMETHAZINE HYDROCHLORIDE 12.5 MG/1
12.5 SUPPOSITORY RECTAL EVERY 6 HOURS PRN
Status: CANCELLED | OUTPATIENT
Start: 2024-10-14

## 2024-10-14 RX ORDER — HYDROXYZINE HYDROCHLORIDE 50 MG/1
50 TABLET, FILM COATED ORAL NIGHTLY PRN
Status: CANCELLED | OUTPATIENT
Start: 2024-10-14

## 2024-10-14 RX ORDER — ONDANSETRON 4 MG/1
4 TABLET, ORALLY DISINTEGRATING ORAL EVERY 8 HOURS PRN
Status: CANCELLED | OUTPATIENT
Start: 2024-10-14

## 2024-10-14 RX ORDER — LEVOTHYROXINE SODIUM 75 UG/1
75 TABLET ORAL
Status: DISCONTINUED | OUTPATIENT
Start: 2024-10-14 | End: 2024-10-15 | Stop reason: HOSPADM

## 2024-10-14 RX ORDER — METHYLERGONOVINE MALEATE 0.2 MG/ML
200 INJECTION INTRAVENOUS ONCE AS NEEDED
Status: DISCONTINUED | OUTPATIENT
Start: 2024-10-14 | End: 2024-10-15 | Stop reason: HOSPADM

## 2024-10-14 RX ADMIN — IBUPROFEN 600 MG: 600 TABLET, FILM COATED ORAL at 22:02

## 2024-10-14 RX ADMIN — Medication 1 TABLET: at 20:44

## 2024-10-14 RX ADMIN — ESCITALOPRAM OXALATE 10 MG: 10 TABLET, FILM COATED ORAL at 20:44

## 2024-10-14 RX ADMIN — IBUPROFEN 600 MG: 600 TABLET, FILM COATED ORAL at 15:14

## 2024-10-14 RX ADMIN — IBUPROFEN 600 MG: 600 TABLET, FILM COATED ORAL at 09:07

## 2024-10-14 RX ADMIN — LEVOTHYROXINE SODIUM 75 MCG: 75 TABLET ORAL at 06:36

## 2024-10-14 RX ADMIN — Medication: at 09:07

## 2024-10-14 NOTE — LACTATION NOTE
This note was copied from a baby's chart.  P2 term baby, 10 hrs old. Mom reports breast feeding going well so far and baby has voided and stooled.  Mom breast fed her first baby for one year and she has Spectra pump.  Reviewed milk production, how to know baby is getting enough milk, expected output, nursing on demand or every 3hrs and encouraged to call for any assistance or questions.

## 2024-10-14 NOTE — ANESTHESIA PREPROCEDURE EVALUATION
Anesthesia Evaluation     Patient summary reviewed and Nursing notes reviewed   NPO Solid Status: > 4 hours  NPO Liquid Status: > 2 hours           Airway   Mallampati: II  TM distance: >3 FB  Neck ROM: full  No difficulty expected  Dental - normal exam     Pulmonary - negative pulmonary ROS and normal exam   Cardiovascular - negative cardio ROS and normal exam  Exercise tolerance: good (4-7 METS)    ECG reviewed        Neuro/Psych  (+) psychiatric history  GI/Hepatic/Renal/Endo    (+) thyroid problem hypothyroidism    Musculoskeletal (-) negative ROS    Abdominal    Substance History - negative use     OB/GYN    (+) Pregnant        Other                    Anesthesia Plan    ASA 2     epidural     (39w0d  Patient last ate at 4:30pm;    Pt was going to be C/S but became 10cm dilated so plan changed to epidural for )    Anesthetic plan, risks, benefits, and alternatives have been provided, discussed and informed consent has been obtained with: patient and spouse/significant other.    CODE STATUS:    Level Of Support Discussed With: Patient  Code Status (Patient has no pulse and is not breathing): CPR (Attempt to Resuscitate)  Medical Interventions (Patient has pulse or is breathing): Full Support

## 2024-10-14 NOTE — ANESTHESIA POSTPROCEDURE EVALUATION
"Patient: Reed Xavier    Procedure Summary       Date: 10/13/24 Room / [facility]    Anesthesia Start:  Anesthesia Stop:     Procedure:  SECTION PRIMARY (Abdomen) Diagnosis:     Surgeons: Madie Adames MD Provider: Shlomo Garcia MD    Anesthesia Type: spinal ASA Status: 2            Anesthesia Type: spinal    Vitals  Vitals Value Taken Time   /81 10/13/24 2230   Temp     Pulse 86 10/13/24 2235   Resp     SpO2 99 % 10/13/24 2235   Vitals shown include unfiled device data.        Post Anesthesia Care and Evaluation    Anesthetic complications: No anesthetic complications    Comments: /81   Pulse 90   Temp 37 °C (98.6 °F) (Oral)   Resp 20   Ht 167.6 cm (66\")   Wt 97.4 kg (214 lb 12.8 oz)   Breastfeeding Yes   BMI 34.67 kg/m²     No anesthesia complications reported to me.    "

## 2024-10-14 NOTE — H&P
[facility]  Obstetric Preop History and Physical:    Patient Name: Reed Xavier  :  1990  MRN:  [id number]      Chief Complaint   Patient presents with    Rupture of Membranes     KETURAH with complaints of SROM at 615pm, light green in color, large amount.  Denies vaginal bleeding, + fetal movement.       Subjective                33 y.o. female  currently at 39w0d, who presented after SROM - possible thin meconium. Hx remarkable for vacuum assisted vaginal delivery with G1 weighing 7#. Does not want to labor for concern of needing another operative vaginal delivery. EFW @ 82nd% (8# 3oz).               Past OB History:       OB History    Para Term  AB Living   3 1 1 0 1 1   SAB IAB Ectopic Molar Multiple Live Births   0 0 0 0 0 1      # Outcome Date GA Lbr Casa/2nd Weight Sex Type Anes PTL Lv   3 Current            2 Term 22 39w2d / 03:30 3175 g (7 lb) M Vag-Vacuum EPI N ROSANGELA      Birth Comments: Panda LDR 5      Complications: Fetal Intolerance      Name: ESTELLE XAVIER      Apgar1: 8  Apgar5: 9   1 AB 21 5w0d             Obstetric Comments   Delivery required vacuum assist- second degree tear, repair. + completed pelvic floor PT       Past Medical History: Past Medical History:   Diagnosis Date    Anxiety     Disease of thyroid gland       Past Surgical History Past Surgical History:   Procedure Laterality Date    DENTAL PROCEDURE      WISDOM TOOTH EXTRACTION Bilateral     WISDOM TOOTH EXTRACTION Bilateral           Family History: Family History   Problem Relation Age of Onset    Cancer Mother         dx age 54- stage 1 +ER breast cancer    Hashimoto's thyroiditis Mother     Hypertension Father     Anxiety disorder Sister     Inguinal hernia Son         repaired at 3 mo    Acute myelogenous leukemia Maternal Grandmother     Hypertension Maternal Grandmother     Atrial fibrillation Maternal Grandmother     Heart attack Maternal Grandfather     Diabetes  type II Maternal Grandfather     Kidney cancer Paternal Grandmother     Diabetes type II Paternal Grandmother     Heart attack Paternal Grandfather          age 45      Social History:  reports that she has never smoked. She has never been exposed to tobacco smoke. She has never used smokeless tobacco.   reports that she does not currently use alcohol.   reports no history of drug use.    Allergies:     Phenergan [promethazine] and Sulfa antibiotics     Objective       Vital Signs Range for the last 24 hours  Temperature: Temp:  [98.6 °F (37 °C)] 98.6 °F (37 °C)   Temp Source: Temp src: Oral   BP: BP: (130-142)/(86-88) 139/86   Pulse: Heart Rate:  [76-88] 76   Respirations: Resp:  [20] 20   SPO2:        fentaNYL-ropivacaine-NaCl, 10 mL/hr  lactated ringers, 125 mL/hr  oxytocin, 250 mL/hr                       Physical Exam:  General: Alert in NAD   Heart Normal rate and regular rhythm   Lungs Clear to auscultation bilaterally     Abdomen Gravid, soft, no masses   Extremities    SVE: C/C/0 trace edema                       Results from last 7 days   Lab Units 10/13/24  2030   WBC 10*3/mm3 8.81   HEMOGLOBIN g/dL 9.9*   HEMATOCRIT % 30.2*   PLATELETS 10*3/mm3 265                               FHR:   TOCO: 150's moderate variability and no decelerations  Q 2-3 minutes.          Assessment & Plan  39 wks with SROM who declines to labor and wanted elective C/S. Has now rapidly progressed to C/C/0 to +1 station. Will get regional anesthesia on board and then make a decision regarding mode of delivery.         Ollie Tong MD  10/13/2024  21:46 EDT

## 2024-10-14 NOTE — PLAN OF CARE
Goal Outcome Evaluation:   Pt doing well. VSS. Fundus and Lochia WNL. Ambulating independently and voiding spontaneously. Breastfeeding well.

## 2024-10-14 NOTE — ANESTHESIA PROCEDURE NOTES
Labor Epidural      Patient reassessed immediately prior to procedure    Patient location during procedure: OB  Indication:at surgeon's request  Performed By  Anesthesiologist: Shlomo Garcia MD  Preanesthetic Checklist  Completed: patient identified, IV checked, site marked, risks and benefits discussed, surgical consent, monitors and equipment checked, pre-op evaluation and timeout performed  Additional Notes  Connected epidural catheter to PCEA and detailed instructions given to patient for usage of PCEA pump.  Prep:  Pt Position:sitting  Sterile Tech:cap, gloves, mask and sterile barrier  Prep:chlorhexidine gluconate and isopropyl alcohol  Monitoring:blood pressure monitoring and EKG  Epidural Block Procedure:  Approach:midline  Guidance:landmark technique and palpation technique  Location:L3-L4  Needle Type:Tuohy  Needle Gauge:17 G  Loss of Resistance Medium: saline  Loss of Resistance: 6cm  Cath Depth at skin:11 cm  Paresthesia: none  Aspiration:negative  Test Dose:negative  Number of Attempts: 1  Post Assessment:  Dressing:occlusive dressing applied and secured with tape  Pt Tolerance:patient tolerated the procedure well with no apparent complications  Complications:no

## 2024-10-14 NOTE — PROGRESS NOTES
[facility]  Vaginal Delivery Progress Note    Patient Name: Reed Xavire  :  1990  MRN:  [id number]      Subjective   Postpartum Day 1: Vaginal Delivery of a female infant.     The patient feels well without complaints.  Her pain is well controlled.  Reports normal lochia.     The patient plans to breastfeed.    Objective     Vital Signs Range for the last 24 hours  Temperature: Temp:  [97.9 °F (36.6 °C)-98.6 °F (37 °C)] 98.3 °F (36.8 °C)       BP: BP: (123-164)/(71-98) 133/90   Pulse: Heart Rate:  [] 73   Respirations: Resp:  [16-20] 16                       Physical Exam:  General: Awake and alert  Abdomen: Fundus: firm, non tender, 2fb below umbilicus  Extremities:  trace edema, NT     Labs:     Results from last 7 days   Lab Units 10/14/24  0630 10/13/24  2030   WBC 10*3/mm3 13.04* 8.81   HEMOGLOBIN g/dL 9.8* 9.9*   HEMATOCRIT % 30.1* 30.2*   PLATELETS 10*3/mm3 237 265       Prenatal labs results reviewed:  Yes   Rubella:  immune  Rh Status:    RH type   Date Value Ref Range Status   10/13/2024 Positive  Final         Assessment & Plan  :     1. PPD1 S/P : Doing well, continue usual cares.     2. Hypothyroid: continue levothyroxine 75mg; plan to check TSH at 6wk PP    3. Postpartum Anemia: hgb 9.8; PO iron at dc    4. Elevated BP: 150/96 this AM with repeat 133/90; if still elevated throughout the day, will add medication          Pregnancy          BRANDON Grady  10/14/2024  09:35 EDT

## 2024-10-14 NOTE — PLAN OF CARE
Problem: Adult Inpatient Plan of Care  Goal: Patient-Specific Goal (Individualized)  Outcome: Progressing  Goal: Absence of Hospital-Acquired Illness or Injury  Outcome: Progressing  Intervention: Identify and Manage Fall Risk  Recent Flowsheet Documentation  Taken 10/13/2024 2230 by Carli Carbajal RN  Safety Promotion/Fall Prevention:   safety round/check completed   clutter free environment maintained  Taken 10/13/2024 2045 by Carli Carbajal RN  Safety Promotion/Fall Prevention: safety round/check completed  Goal: Optimal Comfort and Wellbeing  Outcome: Progressing  Intervention: Provide Person-Centered Care  Recent Flowsheet Documentation  Taken 10/13/2024 2230 by Carli Carbajal RN  Trust Relationship/Rapport:   care explained   choices provided   emotional support provided   empathic listening provided   questions answered   questions encouraged   reassurance provided   thoughts/feelings acknowledged  Taken 10/13/2024 2045 by Carli Carbajal RN  Trust Relationship/Rapport:   care explained   choices provided  Goal: Readiness for Transition of Care  Outcome: Progressing  Intervention: Mutually Develop Transition Plan  Recent Flowsheet Documentation  Taken 10/13/2024 2036 by Carli Carbajal RN  Equipment Currently Used at Home: none     Problem: Skin Injury Risk Increased  Goal: Skin Health and Integrity  Outcome: Progressing     Problem: Anesthesia/Analgesia, Neuraxial  Goal: Baseline Motor Function Return  Outcome: Progressing  Intervention: Optimize Sensorimotor Function and Safety  Recent Flowsheet Documentation  Taken 10/13/2024 2230 by Carli Carbajal RN  Safety Promotion/Fall Prevention:   safety round/check completed   clutter free environment maintained  Taken 10/13/2024 2045 by Carli Carbajal RN  Safety Promotion/Fall Prevention: safety round/check completed  Goal: Effective Urinary Elimination  Outcome: Progressing     Problem: Skin Injury Risk Increased  Goal: Skin  Health and Integrity  Outcome: Progressing     Problem: Adult Inpatient Plan of Care  Goal: Plan of Care Review  Outcome: Met     Problem: Labor  Goal: Hemostasis  Outcome: Met  Goal: Stable Fetal Wellbeing  Outcome: Met  Goal: Effective Progression to Delivery  Outcome: Met  Goal: Absence of Infection Signs and Symptoms  Outcome: Met  Goal: Acceptable Pain Control  Outcome: Met  Goal: Normal Uterine Contraction Pattern  Outcome: Met     Problem: Anesthesia/Analgesia, Neuraxial  Goal: Safe, Effective Infusion Delivery  Outcome: Met  Goal: Stable Patient-Fetal Status  Outcome: Met  Goal: Absence of Infection Signs and Symptoms  Outcome: Met  Goal: Nausea and Vomiting Relief  Outcome: Met  Goal: Optimal Pain Control and Function  Outcome: Met  Goal: Effective Oxygenation and Ventilation  Outcome: Met   Goal Outcome Evaluation:

## 2024-10-14 NOTE — L&D DELIVERY NOTE
[facility]  Vaginal Delivery Note    Patient Name: Reed Xavier  :  1990  MRN:  [id number]    DX:    Pregnancy      Labor status: Active spontaneous labor     Delivery     Delivery: Vaginal, Spontaneous    YOB: 2023   Time of Birth: 10:09 PM     Anesthesia: Epidural    Delivering clinician: Ollie Tong MD       Infant    Findings: Viable female infant    Infant observations: Weight: 3440 g (7 lb 9.3 oz)       Apgars: 9  @ 1 minute /    9  @ 5 minutes     Placenta, Cord, and Fluid    Placenta delivered  Spontaneous  at  10/13/2024 10:11 PM    Cord: 3 vessels present.   Cord blood obtained: Yes         Repair    Episiotomy: No   Lacerations: 2nd degree   Estimated Blood Loss:  Quantitative Blood Loss:    mls.  Quantitative Blood Loss (mL): 238 mL         Delivery narrative: Reed Xavier is a 33 y.o.  at 39w0d.  Presented  after  spontaneous rupture of membranes at 6:15 PM on 10/13/2024  with meconium stained fluid noted.  Had wanted to have elective  section due to a history of vacuum-assisted vaginal delivery with current pregnancy EFW suspected to be approximately 1 pound larger than the prior pregnancy.  While awaiting OR availability she started to contract and quickly became uncomfortable and was noted to be C/C/+1 @ 9:36 PM.  She was taken to a labor room and received epidural anesthesia.  Fetal status reassuring throughout.  She pushed through 3 contractions to have  of viable  female @ 10:09 PM over an intact perineum. ASDE. Delayed cord clamping performed and infant placed in kangaroo care.   3440 g (7 lb 9.3 oz).     Apgar :  9  @ 1 minute / 9  @ 5 minutes.  Placenta delivered spontaneously, intact with 3 vessel cord. Cervix and rectum intact.  Small second degree degree laceration repaired in usual fashion with 3-0 Monocryl suture. Mother and baby recovering good condition.       Ollie Tong MD  10/13/24  22:38 EDT    Note done at time  of delivery then refreshed at a later date to include RN documentation not yet completed.

## 2024-10-15 ENCOUNTER — TELEPHONE (OUTPATIENT)
Dept: FAMILY MEDICINE CLINIC | Facility: CLINIC | Age: 34
End: 2024-10-15
Payer: COMMERCIAL

## 2024-10-15 VITALS
BODY MASS INDEX: 34.52 KG/M2 | DIASTOLIC BLOOD PRESSURE: 76 MMHG | HEART RATE: 83 BPM | RESPIRATION RATE: 16 BRPM | SYSTOLIC BLOOD PRESSURE: 115 MMHG | WEIGHT: 214.8 LBS | TEMPERATURE: 98.9 F | OXYGEN SATURATION: 98 % | HEIGHT: 66 IN

## 2024-10-15 PROBLEM — Z34.90 PREGNANCY: Status: RESOLVED | Noted: 2024-10-13 | Resolved: 2024-10-15

## 2024-10-15 RX ORDER — IBUPROFEN 600 MG/1
600 TABLET, FILM COATED ORAL EVERY 6 HOURS PRN
Qty: 40 TABLET | Refills: 0 | Status: SHIPPED | OUTPATIENT
Start: 2024-10-15

## 2024-10-15 RX ORDER — PSEUDOEPHEDRINE HCL 30 MG
100 TABLET ORAL 2 TIMES DAILY PRN
Qty: 60 CAPSULE | Refills: 0 | Status: SHIPPED | OUTPATIENT
Start: 2024-10-15

## 2024-10-15 RX ORDER — FERROUS SULFATE 325(65) MG
325 TABLET ORAL EVERY OTHER DAY
Qty: 30 TABLET | Refills: 0 | Status: SHIPPED | OUTPATIENT
Start: 2024-10-15

## 2024-10-15 RX ORDER — LABETALOL 100 MG/1
100 TABLET, FILM COATED ORAL EVERY 12 HOURS SCHEDULED
Status: DISCONTINUED | OUTPATIENT
Start: 2024-10-15 | End: 2024-10-15 | Stop reason: HOSPADM

## 2024-10-15 RX ORDER — LEVOTHYROXINE SODIUM 75 UG/1
75 TABLET ORAL
Qty: 30 TABLET | Refills: 0 | Status: SHIPPED | OUTPATIENT
Start: 2024-10-16

## 2024-10-15 RX ORDER — LABETALOL 100 MG/1
100 TABLET, FILM COATED ORAL EVERY 12 HOURS SCHEDULED
Qty: 60 TABLET | Refills: 1 | Status: SHIPPED | OUTPATIENT
Start: 2024-10-15

## 2024-10-15 RX ADMIN — LABETALOL HYDROCHLORIDE 100 MG: 100 TABLET, FILM COATED ORAL at 08:54

## 2024-10-15 RX ADMIN — IBUPROFEN 600 MG: 600 TABLET, FILM COATED ORAL at 04:05

## 2024-10-15 RX ADMIN — IBUPROFEN 600 MG: 600 TABLET, FILM COATED ORAL at 11:19

## 2024-10-15 RX ADMIN — LEVOTHYROXINE SODIUM 75 MCG: 75 TABLET ORAL at 06:17

## 2024-10-15 NOTE — PLAN OF CARE
Problem: Adult Inpatient Plan of Care  Goal: Patient-Specific Goal (Individualized)  Outcome: Met  Goal: Absence of Hospital-Acquired Illness or Injury  Outcome: Met  Intervention: Identify and Manage Fall Risk  Recent Flowsheet Documentation  Taken 10/15/2024 0952 by Radha Watson RN  Safety Promotion/Fall Prevention:   nonskid shoes/slippers when out of bed   safety round/check completed  Intervention: Prevent Skin Injury  Recent Flowsheet Documentation  Taken 10/15/2024 0905 by Radha Watson RN  Body Position: sitting up in bed  Intervention: Prevent Infection  Recent Flowsheet Documentation  Taken 10/15/2024 0905 by Radha Watson RN  Infection Prevention: hand hygiene promoted  Goal: Optimal Comfort and Wellbeing  Outcome: Met  Intervention: Provide Person-Centered Care  Recent Flowsheet Documentation  Taken 10/15/2024 0905 by Radha Watson RN  Trust Relationship/Rapport:   care explained   choices provided   questions answered  Goal: Readiness for Transition of Care  Outcome: Met     Problem: Skin Injury Risk Increased  Goal: Skin Health and Integrity  Outcome: Met  Intervention: Optimize Skin Protection  Recent Flowsheet Documentation  Taken 10/15/2024 0905 by Radha Watson RN  Activity Management: up ad bebeto  Head of Bed (HOB) Positioning: HOB elevated     Problem: Anesthesia/Analgesia, Neuraxial  Goal: Baseline Motor Function Return  Outcome: Met  Intervention: Optimize Sensorimotor Function and Safety  Recent Flowsheet Documentation  Taken 10/15/2024 0952 by Radha Watson RN  Safety Promotion/Fall Prevention:   nonskid shoes/slippers when out of bed   safety round/check completed  Goal: Effective Urinary Elimination  Outcome: Met     Problem: Postpartum (Vaginal Delivery)  Goal: Successful Parent Role Transition  Outcome: Met  Goal: Hemostasis  Outcome: Met  Goal: Absence of Infection Signs and Symptoms  Outcome: Met  Goal: Anesthesia/Sedation Recovery  Outcome: Met  Intervention: Optimize  Anesthesia Recovery  Recent Flowsheet Documentation  Taken 10/15/2024 0952 by Radha Watson, RN  Safety Promotion/Fall Prevention:   nonskid shoes/slippers when out of bed   safety round/check completed  Goal: Optimal Pain Control and Function  Outcome: Met  Intervention: Prevent or Manage Pain  Recent Flowsheet Documentation  Taken 10/15/2024 0905 by Radha Watson, RN  Perineal Care:   absorbent brief/pad changed   perineum cleansed  Goal: Effective Urinary Elimination  Outcome: Met     Problem: Breastfeeding  Goal: Effective Breastfeeding  Outcome: Met   Goal Outcome Evaluation:   Patient PPD2, breast feeding, lochia wnl, patient started on labetalol d/t elevated BP and patient educated on s/s of preclampsia, denies headaches and blurry vision, bonding well with infant, discharging home today

## 2024-10-15 NOTE — TELEPHONE ENCOUNTER
Pt just delivered and dc'd, please schedule pt postpartum followup and breastfeeding weight check for baby by the end of the week. I am happy to be baby's doctor if desired. Thanks!

## 2024-10-15 NOTE — DISCHARGE SUMMARY
Date of Discharge:  10/15/2024    Discharge Diagnosis: Pregnancy s/p vaginal delivery    Presenting Problem/History of Present Illness  Pregnancy [Z34.90]  Spontaneous vaginal delivery [O80]       Hospital Course  Patient is a 33 y.o. female presented with SROM at term. On 10/13/2024,  by Dr. Tong of female infant weighing 7lb 9.3oz. Postpartum her blood pressures remained mildly elevated and she was started on labetalol 100mg BID with instructions for close follow-up. Her post-partum course was otherwise uncomplicated and on PPD 2 she was meeting all criteria for discharge including adequate pain control, minimal lochia, and ability to ambulate, void and tolerate PO intake without difficulty. She was discharged home with standard discharge precautions and instructions.      Procedures Performed  Procedure(s):    Vaginal Delivery       Consults:   Consults       Date and Time Order Name Status Description    10/13/2024  8:04 PM Inpatient Consult to Anesthesiology              Condition on Discharge:   Subjective   Postpartum Day 2 Vaginal Delivery.    The patient feels well without complaints.    Vital Signs  Temp:  [97.6 °F (36.4 °C)-98.4 °F (36.9 °C)] 98.4 °F (36.9 °C)  Heart Rate:  [70-74] 70  Resp:  [16] 16  BP: (130-151)/(87-97) 145/96    Physical Exam:   General: Awake and alert   Abdomen: Fundus: firm, non tender    Extremities:  Calves NT bilaterally    Assessment & Plan     PPD2  S/P : Stable for discharge. Instructions reviewed  Hypothyroid: continue levothyroxine 75 mcg, check TSH at 6wks PP  Postpartum Anemia: PO iron every other day  Elevated BP: labetalol 100mg BID; in office BP check in 3 days. Call with s/s preeclampsia.      Discharge Disposition  Home or Self Care    Discharge Medications     Discharge Medications        New Medications        Instructions Start Date   docusate sodium 100 MG capsule   100 mg, Oral, 2 Times Daily PRN      ferrous sulfate 325 (65 FE) MG tablet   325 mg,  Oral, Every Other Day      ibuprofen 600 MG tablet  Commonly known as: ADVIL,MOTRIN   600 mg, Oral, Every 6 Hours PRN      labetalol 100 MG tablet  Commonly known as: NORMODYNE   100 mg, Oral, Every 12 Hours Scheduled             Changes to Medications        Instructions Start Date   levothyroxine 75 MCG tablet  Commonly known as: SYNTHROID, LEVOTHROID  What changed:   medication strength  how much to take   75 mcg, Oral, Every Early Morning   Start Date: October 16, 2024     omeprazole 20 MG capsule  Commonly known as: priLOSEC  What changed: Another medication with the same name was removed. Continue taking this medication, and follow the directions you see here.   20 mg, Daily             Continue These Medications        Instructions Start Date   cetirizine 10 MG tablet  Commonly known as: zyrTEC   10 mg, Daily      escitalopram 10 MG tablet  Commonly known as: LEXAPRO   10 mg, Oral, Daily      prenatal (CLASSIC) vitamin 28-0.8 MG tablet tablet  Generic drug: prenatal vitamin   Daily                   Activity at Discharge: restrictions reviewed    Follow-up Appointments    BP check in 3 days  Postpartum Exam: 6 weeks      Test Results Pending at Discharge       BRANDON Grady  10/15/24  09:57 EDT

## 2024-10-15 NOTE — DISCHARGE INSTRUCTIONS
I recommend:  - Light activity such as daily walks will help with healing and recovery.  - Taking your pain medication as prescribed.  - Pelvic rest (nothing in vagina including sex and tampons, no baths or pools) for 6 weeks.  - Check blood pressure in the morning before taking medication--hold dose if BP <100/60    Please call the office if you experience:  - Fever or chills  - Difficulty breathing  - Excessive swelling in 1 leg  - Acute increase in pain  - Increased bleeding (enough to saturate a pad in 1 hour or less)   - Headache that does not improve with rest, medication, or hydration  - Vision changes  - Right sided abdominal pain  - Blood pressure reading >160/100

## 2024-10-15 NOTE — PAYOR COMM NOTE
"Reed Xavier (33 y.o. Female)       Date of Birth   1990    Social Security Number       Address   [address] [place] [address]    Home Phone   [phone]    MRN   [id number]       Holiness   Adventism    Marital Status                               Admission Date   10/13/24    Admission Type   Emergency    Admitting Provider   Ollie Tong MD    Attending Provider   Madie Adames MD    Department, Room/Bed   [address], [address]361/1       Discharge Date       Discharge Disposition       Discharge Destination                                 Attending Provider: Madie Adames MD    Allergies: Phenergan [Promethazine], Sulfa Antibiotics    Isolation: None   Infection: None   Code Status: CPR    Ht: 167.6 cm (66\")   Wt: 97.4 kg (214 lb 12.8 oz)    Admission Cmt: None   Principal Problem: None                  Active Insurance as of 10/13/2024       Primary Coverage       Payor Plan Insurance Group Employer/Plan Group    ANTHEM BLUE CROSS ANTHEM BLUE CROSS BLUE SHIELD PPO [id number]       Payor Plan Address Payor Plan Phone Number Payor Plan Fax Number Effective Dates    [address] [phone]  8/1/2023 - None Entered    [place] [id number]         Subscriber Name Subscriber Birth Date Member ID       RAOUL XAVIER 8/5/1993 [id number]                     Emergency Contacts        (Rel.) Home Phone Work Phone Mobile Phone    TJRAOUL (Spouse) [phone] -- --              Insurance Information                  ANTHEM BLUE CROSS/ANTHEM BLUE CROSS BLUE SHIELD PPO Phone: [phone]    Subscriber: Raoul Xavier Subscriber#: [id number]    Group#: [id number] Precert#: --    Authorization#: -- Effective Date: --          Problem List             Codes Noted - Resolved       Hospital    Pregnancy ICD-10-CM: Z34.90  ICD-9-CM: V22.2 10/13/2024 - Present       Non-Hospital    Other specified hypothyroidism (Chronic) " ICD-10-CM: E03.8  ICD-9-CM: 244.8 2023 - Present    Anxiety (Chronic) ICD-10-CM: F41.9  ICD-9-CM: 300.00 2023 - Present    Family history of diabetes mellitus (Chronic) ICD-10-CM: Z83.3  ICD-9-CM: V18.0 2023 - Present    Family history of cardiovascular disease (Chronic) ICD-10-CM: Z82.49  ICD-9-CM: V17.49 2023 - Present    Seasonal allergies (Chronic) ICD-10-CM: J30.2  ICD-9-CM: 477.9 2023 - Present    History of abnormal cervical Pap smear ICD-10-CM: Z87.42  ICD-9-CM: V13.29 2019 - Present        History & Physical        Ollie Tong MD at 10/13/24 2125          [facility]  Obstetric Preop History and Physical:    Patient Name: Reed Xavier  :  1990  MRN:  [id number]      Chief Complaint   Patient presents with    Rupture of Membranes     KETURAH with complaints of SROM at 615pm, light green in color, large amount.  Denies vaginal bleeding, + fetal movement.       Subjective               33 y.o. female  currently at 39w0d, who presented after SROM - possible thin meconium. Hx remarkable for vacuum assisted vaginal delivery with G1 weighing 7#. Does not want to labor for concern of needing another operative vaginal delivery. EFW @ 82nd% (8# 3oz).               Past OB History:       OB History    Para Term  AB Living   3 1 1 0 1 1   SAB IAB Ectopic Molar Multiple Live Births   0 0 0 0 0 1      # Outcome Date GA Lbr Casa/2nd Weight Sex Type Anes PTL Lv   3 Current            2 Term 22 39w2d / 03:30 3175 g (7 lb) M Vag-Vacuum EPI N ROSANGELA      Birth Comments: Panda LDR 5      Complications: Fetal Intolerance      Name: STERLING XAVIERTIM      Apgar1: 8  Apgar5: 9   1 AB 21 5w0d             Obstetric Comments   Delivery required vacuum assist- second degree tear, repair. + completed pelvic floor PT       Past Medical History: Past Medical History:   Diagnosis Date    Anxiety     Disease of thyroid gland       Past Surgical History Past  Surgical History:   Procedure Laterality Date    DENTAL PROCEDURE      WISDOM TOOTH EXTRACTION Bilateral     WISDOM TOOTH EXTRACTION Bilateral           Family History: Family History   Problem Relation Age of Onset    Cancer Mother         dx age 54- stage 1 +ER breast cancer    Hashimoto's thyroiditis Mother     Hypertension Father     Anxiety disorder Sister     Inguinal hernia Son         repaired at 3 mo    Acute myelogenous leukemia Maternal Grandmother     Hypertension Maternal Grandmother     Atrial fibrillation Maternal Grandmother     Heart attack Maternal Grandfather     Diabetes type II Maternal Grandfather     Kidney cancer Paternal Grandmother     Diabetes type II Paternal Grandmother     Heart attack Paternal Grandfather          age 45      Social History:  reports that she has never smoked. She has never been exposed to tobacco smoke. She has never used smokeless tobacco.   reports that she does not currently use alcohol.   reports no history of drug use.    Allergies:     Phenergan [promethazine] and Sulfa antibiotics     Objective      Vital Signs Range for the last 24 hours  Temperature: Temp:  [98.6 °F (37 °C)] 98.6 °F (37 °C)   Temp Source: Temp src: Oral   BP: BP: (130-142)/(86-88) 139/86   Pulse: Heart Rate:  [76-88] 76   Respirations: Resp:  [20] 20   SPO2:        fentaNYL-ropivacaine-NaCl, 10 mL/hr  lactated ringers, 125 mL/hr  oxytocin, 250 mL/hr                       Physical Exam:  General: Alert in NAD   Heart Normal rate and regular rhythm   Lungs Clear to auscultation bilaterally     Abdomen Gravid, soft, no masses   Extremities    SVE: C/C/0 trace edema                       Results from last 7 days   Lab Units 10/13/24  2030   WBC 10*3/mm3 8.81   HEMOGLOBIN g/dL 9.9*   HEMATOCRIT % 30.2*   PLATELETS 10*3/mm3 265                               FHR:   TOCO: 150's moderate variability and no decelerations  Q 2-3 minutes.          Assessment & Plan 39 wks with SROM who  declines to labor and wanted elective C/S. Has now rapidly progressed to C/C/0 to +1 station. Will get regional anesthesia on board and then make a decision regarding mode of delivery.         Ollie Tong MD  10/13/2024  21:46 EDT                       Electronically signed by Ollie Tong MD at 10/13/24 2149       Facility-Administered Medications as of 10/14/2024   Medication Dose Route Frequency Provider Last Rate Last Admin    acetaminophen (TYLENOL) tablet 650 mg  650 mg Oral Q6H PRN Ollie Tong MD        azithromycin (ZITHROMAX) 500 mg in sodium chloride 0.9 % 250 mL IVPB-VTB  500 mg Intravenous Once Ollie Tong MD        benzocaine (AMERICAINE) 20 % rectal ointment   Rectal PRN Ollie Tong MD        benzocaine-menthol (DERMOPLAST) 20-0.5 % topical spray   Topical PRN Ollie Tong MD   Given at 10/14/24 0907    [START ON 10/15/2024] bisacodyl (DULCOLAX) suppository 10 mg  10 mg Rectal Daily PRN Ollie Tong MD        calcium carbonate (TUMS) chewable tablet 500 mg (200 mg elemental)  2 tablet Oral TID PRN Ollie Tong MD        docusate sodium (COLACE) capsule 100 mg  100 mg Oral BID Ollie Tong MD        escitalopram (LEXAPRO) tablet 10 mg  10 mg Oral Daily Ollie Tong MD   10 mg at 10/14/24 2044    famotidine (PEPCID) injection 20 mg  20 mg Intravenous Q12H Ollie Tong MD        fentaNYL 2mcg/mL and ropivacaine 0.2% in NS epidural 100mL  10 mL/hr Epidural Continuous Shlomo Garcia MD   Stopped at 10/13/24 2220    fentaNYL 2mcg/mL and ropivacaine 0.2% in NS epidural 100mL  10 mL/hr Epidural Continuous Shlomo Garcia MD        Hydrocort-Pramoxine (Perianal) (PROCTOFOAM-HS) 1-1 % rectal foam 1 Application  1 Application Topical PRN Ollie Tong MD        Hydrocortisone (Perianal) (ANUSOL-HC) 2.5 % rectal cream   Rectal PRN Ollie Tong MD        hydrOXYzine (ATARAX) tablet 50 mg  50 mg Oral Nightly  PRN Ollie Tong MD        ibuprofen (ADVIL,MOTRIN) tablet 600 mg  600 mg Oral Q6H PRN Ollie Tong MD   600 mg at 10/14/24 1514    ketorolac (TORADOL) injection 15 mg  15 mg Intravenous Q6H PRN Ollie Tong MD        [COMPLETED] lactated ringers bolus 1,000 mL  1,000 mL Intravenous Once Ollie Tong MD 1,000 mL/hr at 10/13/24 2027 1,000 mL at 10/13/24 2027    [COMPLETED] lactated ringers bolus 1,000 mL  1,000 mL Intravenous Once Shlomo Garcia MD 1,000 mL/hr at 10/13/24 2045 1,000 mL at 10/13/24 2045    lactated ringers infusion  125 mL/hr Intravenous Continuous Ollie Tong MD        levothyroxine (SYNTHROID, LEVOTHROID) tablet 75 mcg  75 mcg Oral Q AM Ollie Tong MD   75 mcg at 10/14/24 0636    magnesium hydroxide (MILK OF MAGNESIA) suspension 10 mL  10 mL Oral Daily PRN Ollie Tong MD        methylergonovine (METHERGINE) injection 200 mcg  200 mcg Intramuscular Once PRN Ollie Tong MD        miSOPROStol (CYTOTEC) tablet 600 mcg  600 mcg Oral Once PRN Ollie Tong MD        ondansetron (ZOFRAN) injection 4 mg  4 mg Intravenous Q6H PRN Ollie Tong MD        ondansetron (ZOFRAN) injection 4 mg  4 mg Intravenous Q6H PRN Ollie Tong MD        ondansetron ODT (ZOFRAN-ODT) disintegrating tablet 4 mg  4 mg Oral Q8H PRN Ollie Tong MD        [COMPLETED] oxytocin (PITOCIN) 30 units in 0.9% sodium chloride 500 mL (premix)  999 mL/hr Intravenous Once Ollie Tong  mL/hr at 10/13/24 2211 999 mL/hr at 10/13/24 2211    Followed by    [] oxytocin (PITOCIN) 30 units in 0.9% sodium chloride 500 mL (premix)  250 mL/hr Intravenous Continuous Ollie Tong MD   Stopped at 10/13/24 2325    [COMPLETED] oxytocin (PITOCIN) 30 units in 0.9% sodium chloride 500 mL (premix)  125 mL/hr Intravenous Once PRN Ollie Tong  mL/hr at 10/13/24 2326 125 mL/hr at 10/13/24 2326    oxytocin (PITOCIN) 30 units in  0.9% sodium chloride 500 mL (premix)  125 mL/hr Intravenous Continuous PRN Ollie Tong MD        prenatal vitamin tablet 1 tablet  1 tablet Oral Daily Ollie Tong MD   1 tablet at 10/14/24 2044    traMADol (ULTRAM) tablet 50 mg  50 mg Oral Q6H PRN Ollie Tong MD        tranexamic acid 1000 mg in 100 mL 0.7% NaCl infusion (premix)  1,000 mg Intravenous Once PRN Ollie Tong MD         Lab Results (last 72 hours)       Procedure Component Value Units Date/Time    CBC & Differential [[id number]]  (Abnormal) Collected: 10/14/24 0630    Specimen: Blood Updated: 10/14/24 0706    Narrative:      The following orders were created for panel order CBC & Differential.  Procedure                               Abnormality         Status                     ---------                               -----------         ------                     CBC Auto Differential[[id number]]        Abnormal            Final result                 Please view results for these tests on the individual orders.    CBC Auto Differential [[id number]]  (Abnormal) Collected: 10/14/24 0630    Specimen: Blood Updated: 10/14/24 0706     WBC 13.04 10*3/mm3      RBC 3.45 10*6/mm3      Hemoglobin 9.8 g/dL      Hematocrit 30.1 %      MCV 87.2 fL      MCH 28.4 pg      MCHC 32.6 g/dL      RDW 13.5 %      RDW-SD 41.6 fl      MPV 12.0 fL      Platelets 237 10*3/mm3      Neutrophil % 84.8 %      Lymphocyte % 10.7 %      Monocyte % 3.8 %      Eosinophil % 0.1 %      Basophil % 0.1 %      Immature Grans % 0.5 %      Neutrophils, Absolute 11.06 10*3/mm3      Lymphocytes, Absolute 1.40 10*3/mm3      Monocytes, Absolute 0.50 10*3/mm3      Eosinophils, Absolute 0.01 10*3/mm3      Basophils, Absolute 0.01 10*3/mm3      Immature Grans, Absolute 0.06 10*3/mm3      nRBC 0.0 /100 WBC     Rubella Antibody, IgG [[id number]] Resulted: 03/18/24    Specimen: Blood Updated: 10/14/24 0000     External Rubella Qual Immune    HIV-1 Antibody, EIA [[id number]]  Resulted: 03/18/24    Specimen: Blood Updated: 10/14/24 0000     External HIV Antibody Non-Reactive    Hepatitis C Antibody [[id number]] Resulted: 03/18/24    Specimen: Blood Updated: 10/14/24 0000     External Hepatitis C Ab Negative    Hepatitis B Surface Antigen [[id number]] Resulted: 03/18/24    Specimen: Blood Updated: 10/14/24 0000     External Hepatitis B Surface Ag Negative    RPR Qualitative with Reflex to Quant [[id number]] Resulted: 03/18/24    Specimen: Blood Updated: 10/14/24 0000     External RPR Non-Reactive    Treponema pallidum AB w/Reflex RPR [[id number]]  (Normal) Collected: 10/13/24 2030    Specimen: Blood Updated: 10/13/24 2107     Treponemal AB Total Non-Reactive    Narrative:      Reactive results will reflex RPR testing.    Comprehensive Metabolic Panel [[id number]]  (Abnormal) Collected: 10/13/24 2030    Specimen: Blood Updated: 10/13/24 2059     Glucose 98 mg/dL      BUN 7 mg/dL      Creatinine 0.68 mg/dL      Sodium 138 mmol/L      Potassium 4.0 mmol/L      Chloride 106 mmol/L      CO2 18.8 mmol/L      Calcium 9.1 mg/dL      Total Protein 6.2 g/dL      Albumin 3.3 g/dL      ALT (SGPT) 14 U/L      AST (SGOT) 22 U/L      Alkaline Phosphatase 171 U/L      Total Bilirubin <0.2 mg/dL      Globulin 2.9 gm/dL      A/G Ratio 1.1 g/dL      BUN/Creatinine Ratio 10.3     Anion Gap 13.2 mmol/L      eGFR 118.8 mL/min/1.73     Narrative:      GFR Normal >60  Chronic Kidney Disease <60  Kidney Failure <15      POC PH Fluid (Nitrazine) [[id number]] Collected: 10/13/24 2039    Specimen: Body Fluid Updated: 10/13/24 2039     pH, Fluid 7.50     Lot Number [id number]     Nitrazine Expiration Date 10/30/2024    CBC & Differential [[id number]]  (Abnormal) Collected: 10/13/24 2030    Specimen: Blood Updated: 10/13/24 2038    Narrative:      The following orders were created for panel order CBC & Differential.  Procedure                               Abnormality         Status                     ---------                                -----------         ------                     CBC Auto Differential[[id number]]        Abnormal            Final result                 Please view results for these tests on the individual orders.    CBC Auto Differential [[id number]]  (Abnormal) Collected: 10/13/24 2030    Specimen: Blood Updated: 10/13/24 2038     WBC 8.81 10*3/mm3      RBC 3.45 10*6/mm3      Hemoglobin 9.9 g/dL      Hematocrit 30.2 %      MCV 87.5 fL      MCH 28.7 pg      MCHC 32.8 g/dL      RDW 13.4 %      RDW-SD 42.3 fl      MPV 11.5 fL      Platelets 265 10*3/mm3      Neutrophil % 74.4 %      Lymphocyte % 19.2 %      Monocyte % 5.3 %      Eosinophil % 0.5 %      Basophil % 0.1 %      Immature Grans % 0.5 %      Neutrophils, Absolute 6.56 10*3/mm3      Lymphocytes, Absolute 1.69 10*3/mm3      Monocytes, Absolute 0.47 10*3/mm3      Eosinophils, Absolute 0.04 10*3/mm3      Basophils, Absolute 0.01 10*3/mm3      Immature Grans, Absolute 0.04 10*3/mm3      nRBC 0.0 /100 WBC           Imaging Results (Last 72 Hours)       ** No results found for the last 72 hours. **          ECG/EMG Results (last 72 hours)       ** No results found for the last 72 hours. **          Orders (last 72 hrs)        Start     Ordered    10/15/24 0800  Sitz Bath  3 Times Daily        Comments: PRN    10/14/24 0517    10/15/24 0000  bisacodyl (DULCOLAX) suppository 10 mg  Daily PRN         10/14/24 0517    10/14/24 0900  escitalopram (LEXAPRO) tablet 10 mg  Daily         10/14/24 0101    10/14/24 0900  prenatal vitamin tablet 1 tablet  Daily         10/14/24 0101    10/14/24 0900  docusate sodium (COLACE) capsule 100 mg  2 Times Daily         10/14/24 0517    10/14/24 0600  levothyroxine (SYNTHROID, LEVOTHROID) tablet 75 mcg  Every Early Morning         10/14/24 0101    10/14/24 0600  CBC & Differential  Morning Draw        Comments: Lab to draw      10/14/24 0517    10/14/24 0600  CBC Auto Differential  PROCEDURE ONCE        Comments: Lab to draw       10/14/24 0517    10/14/24 0516  Code Status and Medical Interventions: CPR (Attempt to Resuscitate); Full  Continuous         10/14/24 0517    10/14/24 0516  Vital Signs Per hospital policy  Per Hospital Policy         10/14/24 0517    10/14/24 0516  Notify Physician  Until Discontinued         10/14/24 0517    10/14/24 0516  Up Ad Adriana  Until Discontinued         10/14/24 0517    10/14/24 0516  Ambulate Patient  Every Shift       10/14/24 0517    10/14/24 0516  Patient May Shower  Once         10/14/24 0517    10/14/24 0516  Diet: Regular/House; Fluid Consistency: Thin (IDDSI 0)  Diet Effective Now         10/14/24 0517    10/14/24 0516  Advance Diet As Tolerated -Regular  Until Discontinued         10/14/24 0517    10/14/24 0516  Fundal and Lochia Check  Per Hospital Policy        Comments: Q 15 min x 4, Q 30 min x 2, then Q Shift    10/14/24 0517    10/14/24 0516  RN to Assess Rh Status & Place RhIG Evaluation Order if Indicated  Continuous         10/14/24 0517    10/14/24 0516  Bladder Assessment  Per Order Details        Comments: Postpartum 1) Upon Admission to Unit & Every 4 Hours PRN Until Voiding. 2) Out of Bed to Void in 8 Hours.    10/14/24 0517    10/14/24 0516  Straight Cath  Per Order Details        Comments: Postpartum: If Distended & Unable to Void, May Repeat Once.    10/14/24 0517    10/14/24 0516  Indwelling Urinary Catheter  Per Order Details        Comments: Postpartum : After Straight Cathed x2 or if Greater Than 1000mL Residual, Insert Indwelling Urinary Catheter Until Further MD Order.    10/14/24 0517    10/14/24 0516  Breast pump to bed  Once         10/14/24 0517    10/14/24 0516  If indicated -- Please administer RH Immunoglobulin based on results of cord blood evaluation and fetal screen lab tests, pharmacy to dispense  Per Order Details        Comments: See Process Instructions For Reference Range Details.    10/14/24 0517    10/14/24 0516  VTE Prophylaxis Not Indicated: Low Risk;  Obesity - BMI >30 (1)  Once         10/14/24 0517    10/14/24 0515  calcium carbonate (TUMS) chewable tablet 500 mg (200 mg elemental)  3 Times Daily PRN         10/14/24 0517    10/14/24 0515  oxytocin (PITOCIN) 30 units in 0.9% sodium chloride 500 mL (premix)  Continuous PRN         10/14/24 0517    10/14/24 0515  ibuprofen (ADVIL,MOTRIN) tablet 600 mg  Every 6 Hours PRN         10/14/24 0517    10/14/24 0515  acetaminophen (TYLENOL) tablet 650 mg  Every 6 Hours PRN         10/14/24 0517    10/14/24 0515  traMADol (ULTRAM) tablet 50 mg  Every 6 Hours PRN         10/14/24 0517    10/14/24 0515  ketorolac (TORADOL) injection 15 mg  Every 6 Hours PRN         10/14/24 0517    10/14/24 0515  miSOPROStol (CYTOTEC) tablet 600 mcg  Once As Needed         10/14/24 0517    10/14/24 0515  methylergonovine (METHERGINE) injection 200 mcg  Once As Needed         10/14/24 0517    10/14/24 0515  tranexamic acid 1000 mg in 100 mL 0.7% NaCl infusion (premix)  Once As Needed         10/14/24 0517    10/14/24 0515  hydrOXYzine (ATARAX) tablet 50 mg  Nightly PRN         10/14/24 0517    10/14/24 0515  magnesium hydroxide (MILK OF MAGNESIA) suspension 10 mL  Daily PRN         10/14/24 0517    10/14/24 0515  benzocaine-menthol (DERMOPLAST) 20-0.5 % topical spray  As Needed         10/14/24 0517    10/14/24 0515  Hydrocort-Pramoxine (Perianal) (PROCTOFOAM-HS) 1-1 % rectal foam 1 Application  As Needed         10/14/24 0517    10/14/24 0515  Hydrocortisone (Perianal) (ANUSOL-HC) 2.5 % rectal cream  As Needed         10/14/24 0517    10/14/24 0515  benzocaine (AMERICAINE) 20 % rectal ointment  As Needed         10/14/24 0517    10/14/24 0515  ondansetron ODT (ZOFRAN-ODT) disintegrating tablet 4 mg  Every 8 Hours PRN         10/14/24 0517    10/14/24 0515  ondansetron (ZOFRAN) injection 4 mg  Every 6 Hours PRN         10/14/24 0517    10/14/24 0000  Vital Signs q 4 while awake  Every 4 Hours      Comments: While the patient is awake.     10/13/24 2003    10/14/24 0000  Hepatitis C Antibody        Comments: This is an external result entered through the Results Console.      10/14/24 0000    10/14/24 0000  Hepatitis B Surface Antigen        Comments: This is an external result entered through the Results Console.      10/14/24 0000    10/14/24 0000  RPR Qualitative with Reflex to Quant        Comments: This is an external result entered through the Results Console.      10/14/24 0000    10/14/24 0000  Rubella Antibody, IgG        Comments: This is an external result entered through the Results Console.      10/14/24 0000    10/14/24 0000  HIV-1 Antibody, EIA        Comments: This is an external result entered through the Results Console.      10/14/24 0000    10/13/24 2344  Specimen To Pathology  Once         10/13/24 2343    10/13/24 2316  oxytocin (PITOCIN) 30 units in 0.9% sodium chloride 500 mL (premix)  Once As Needed         10/13/24 2316    10/13/24 2315  Transfer to postpartum when discharge criteria met.  Until Discontinued         10/13/24 2314    10/13/24 2315  Transfer to postpartum when discharge criteria met.  Until Discontinued         10/13/24 2314    10/13/24 2245  fentaNYL 2mcg/mL and ropivacaine 0.2% in NS epidural 100mL  Continuous         10/13/24 2149    10/13/24 2230  lactated ringers bolus 1,000 mL  Once         10/13/24 2140    10/13/24 2230  fentaNYL 2mcg/mL and ropivacaine 0.2% in NS epidural 100mL  Continuous         10/13/24 2140    10/13/24 2227  Transfer Patient  Once         10/13/24 2228    10/13/24 2215  famotidine (PEPCID) injection 20 mg  Every 12 Hours Scheduled         10/13/24 2127    10/13/24 2215  azithromycin (ZITHROMAX) 500 mg in sodium chloride 0.9 % 250 mL IVPB-VTB  Once         10/13/24 2127    10/13/24 2150  Vital Signs Per Anesthesia Guidelines  Per Order Details        Comments: Every 2 Minutes x5, Every 5 Minutes x4, Then If Stable Every 15 Minutes    10/13/24 2149    10/13/24 2150  Start IV with #16  "or #18 gauge angiocath.  Once         10/13/24 2149    10/13/24 2150  Fetal Heart Rate Monitor  Once         10/13/24 2149    10/13/24 2150  Nurse or anesthesiologist to remain with patient for 15 minutes following dosing.  Until Discontinued         10/13/24 2149    10/13/24 2150  Facilitate maternal postion on side and maintain uterine displacement.  Until Discontinued         10/13/24 2149    10/13/24 2150  Consult anesthesia services prior to changing epidural infusion/rate.  Until Discontinued         10/13/24 2149    10/13/24 2150  Nurse may remove epidural catheter after delivery.  Until Discontinued         10/13/24 2149    10/13/24 2150  Insert Indwelling Urinary Catheter  Once        Placed in \"And\" Linked Group    10/13/24 2149    10/13/24 2150  Assess Need for Indwelling Urinary Catheter - Follow Removal Protocol  Continuous        Comments: Indwelling Urinary Catheter Removal Criteria  Discontinue Indwelling Urinary Catheter Unless One of the Following is Present:  Urinary Retention or Obstruction  Chronic Urinary Catheter Use  End of Life  Critical Illness with Strict I/O   Tract or Abdominal Surgery  Stage 3/4 Sacral / Perineal Wound  Required Activity Restriction: Trauma  Required Activity Restriction: Spine Surgery  If Patient is Being Followed by Urology Contact Them PRIOR to Removal  Do Not Remove Indwelling Urinary Catheter Order is Present with a CLINICAL REASON to Maintain the Catheter. Provider is Required to Include a Clinical Reason to Maintain a Urinary Catheter    Patient Admitted With Indwelling Urinary Catheter (Not Placed at [facility])  Assess for Continued Need & Document Medical Necessity  If Infection is Suspected, Contact the Provider       Placed in \"And\" Linked Group    10/13/24 2149    10/13/24 2150  Urinary Catheter Care  Every Shift,   Status:  Canceled      Placed in \"And\" Linked Group    10/13/24 2149    10/13/24 2150  Notify physician for the following conditions:  " "Until Discontinued         10/13/24 2149    10/13/24 2149  ePHEDrine injection 5 mg  As Needed,   Status:  Discontinued         10/13/24 2149    10/13/24 2149  ondansetron (ZOFRAN) injection 4 mg  Once As Needed,   Status:  Discontinued         10/13/24 2149    10/13/24 2149  famotidine (PEPCID) injection 20 mg  Once As Needed,   Status:  Discontinued         10/13/24 2149    10/13/24 2149  diphenhydrAMINE (BENADRYL) injection 12.5 mg  Every 8 Hours PRN,   Status:  Discontinued         10/13/24 2149    10/13/24 2139  Vital Signs Per Anesthesia Guidelines  Per Order Details        Comments: Every 2 Minutes x5, Every 5 Minutes x4, Then If Stable Every 15 Minutes    10/13/24 2140    10/13/24 2139  Start IV (16 or 18 Gauge)  Once         10/13/24 2140    10/13/24 2139  Fetal Heart Rate Monitor  Once         10/13/24 2140    10/13/24 2139  Nurse or Anesthesiologist to Remain With Patient for 15 Minutes Following Dosing  Continuous         10/13/24 2140    10/13/24 2139  Facilitate Maternal Position on Side & Maintain Uterine Displacement  Continuous         10/13/24 2140    10/13/24 2139  Consult Anesthesia Prior to Changing Epidural Infusion / Rate  Continuous         10/13/24 2140    10/13/24 2139  Notify Provider  Continuous        Comments: Open Order Report to View Parameters Requiring Provider Notification    10/13/24 2140    10/13/24 2138  ePHEDrine injection 10 mg  Every 10 Minutes PRN,   Status:  Discontinued         10/13/24 2140    10/13/24 2125  ondansetron (ZOFRAN) injection 4 mg  Every 6 Hours PRN         10/13/24 2127    10/13/24 2115  oxytocin (PITOCIN) 30 units in 0.9% sodium chloride 500 mL (premix)  Continuous        Placed in \"Followed by\" Linked Group    10/13/24 2003    10/13/24 2100  sodium chloride 0.9 % flush 10 mL  Every 12 Hours Scheduled,   Status:  Discontinued         10/13/24 2003    10/13/24 2100  lactated ringers bolus 1,000 mL  Once         10/13/24 2003    10/13/24 2100  lactated " "ringers infusion  Continuous         10/13/24 2003    10/13/24 2100  acetaminophen (TYLENOL) tablet 1,000 mg  Once,   Status:  Discontinued         10/13/24 2003    10/13/24 2100  ketorolac (TORADOL) injection 30 mg  Once,   Status:  Discontinued         10/13/24 2003    10/13/24 2100  ceFAZolin 2000 mg IVPB in 100 mL NS (MBP)  Once,   Status:  Discontinued         10/13/24 2003    10/13/24 2100  Sod Citrate-Citric Acid (BICITRA) oral solution 30 mL  Once,   Status:  Discontinued         10/13/24 2003    10/13/24 2045  oxytocin (PITOCIN) 30 units in 0.9% sodium chloride 500 mL (premix)  Once        Placed in \"Followed by\" Linked Group    10/13/24 2003    10/13/24 2005  Comprehensive Metabolic Panel  STAT         10/13/24 2004    10/13/24 2004  Admit To Obstetrics Inpatient  Once         10/13/24 2003    10/13/24 2004  Code Status and Medical Interventions: CPR (Attempt to Resuscitate); Full Support  Continuous,   Status:  Canceled         10/13/24 2003    10/13/24 2004  Obtain Informed Consent  Once         10/13/24 2003    10/13/24 2004  Vital Signs Per Hospital Policy  Per Hospital Policy         10/13/24 2003    10/13/24 2004  Continuous Fetal Monitoring With NST on Admission and Prior to Initiation of Oxytocin.  Per Order Details        Comments: Continuous Fetal Monitoring With NST on Admission    10/13/24 2003    10/13/24 2004  External Uterine Contraction Monitoring  Per Hospital Policy         10/13/24 2003    10/13/24 2004  Notify Provider (Specified)  Until Discontinued         10/13/24 2003    10/13/24 2004  Notify Provider of Tachysystole (Per Hospital Algorithm)  Until Discontinued         10/13/24 2003    10/13/24 2004  Notify Provider if Membranes Ruptured, Bleeding Greater Than 1 Pad Per Hour, Fetal Heart Tone Abnormality or Severe Pain  Until Discontinued         10/13/24 2003    10/13/24 2004  Initiate Group Beta Strep (GBS) Prophylaxis Protocol, If Criteria Met  Continuous        Comments: NO " "TREATMENT RECOMMENDED IF: 1) Maternal GBS Status Known Negative 2) Scheduled  Birth With Intact Membranes, Not in Labor 3) Maternal GBS Status Unknown, No Risk Factors  TREAT WITH ANTIBIOTICS IF:  1) Maternal GBS Status Known Positive 2) Maternal GBS Status Unknown With Risk Factors: a)  Previous Infant Affected By GBS Infection b) GBS Urinary Tract Infection (UTI) or Bacteriuria During Pregnancy c) Unexplained Maternal Fever (100.4F (38C) or Greater) During Labor d)  Prolonged Rupture of Membranes (18 or More Hours) e) Gestational Age Less Than 37 Weeks    10/13/24 2003    10/13/24 2004  Insert Indwelling Urinary Catheter  Once,   Status:  Canceled        Comments: Follow Protocol As Outlined in Process Instructions (Open Order Report to View Full Instructions)   Placed in \"And\" Linked Group    10/13/24 2003    10/13/24 2004  Assess Need for Indwelling Urinary Catheter - Follow Removal Protocol  Continuous,   Status:  Canceled        Comments: Follow Protocol As Outlined in Process Instructions (Open Order Report to View Full Instructions)   Placed in \"And\" Linked Group    10/13/24 2003    10/13/24 2004  Abdominal Prep with Clippers  Once         10/13/24 2003    10/13/24 2004  Chlorhexadine Skin Prep Unless Otherwise Indicated  Once         10/13/24 2003    10/13/24 2004  SCD (sequential compression devices)  Once         10/13/24 2003    10/13/24 2004  Document Gatorade Consumption Prior to Admission (Yes or No)  Once         10/13/24 2003    10/13/24 2004  Inpatient Consult to Anesthesiology  Once        Specialty:  Anesthesiology  Provider:  (Not yet assigned)    10/13/24 2003    10/13/24 2004  Type & Screen  Once         10/13/24 2003    10/13/24 2004  Treponema pallidum AB w/Reflex RPR  STAT         10/13/24 2003    10/13/24 2004  CBC & Differential  STAT         10/13/24 2003    10/13/24 2004  Insert Peripheral IV  Once         10/13/24 2003    10/13/24 2004  Saline Lock & Maintain IV Access  " "Continuous         10/13/24 2003    10/13/24 2004  Notify Provider (Specified)  Until Discontinued         10/13/24 2003    10/13/24 2004  Vital Signs Per Hospital Policy  Per Hospital Policy         10/13/24 2003    10/13/24 2004  Strict Bed Rest  Until Discontinued         10/13/24 2003    10/13/24 2004  Fundal & Lochia Check  Per Order Details        Comments: Every 15 Minutes x4, Then Every 30 Minutes x2, Then Every Shift    10/13/24 2003    10/13/24 2004  Fundal & Lochia Check  Every Shift       10/13/24 2003    10/13/24 2004  NPO Diet NPO Type: Sips with Meds  Diet Effective Now,   Status:  Canceled         10/13/24 2003    10/13/24 2004  Advance Diet As Tolerated -  Until Discontinued         10/13/24 2003    10/13/24 2004  CBC Auto Differential  PROCEDURE ONCE         10/13/24 2004    10/13/24 2003  Urinary Catheter Care  Every Shift,   Status:  Canceled      Placed in \"And\" Linked Group    10/13/24 2003    10/13/24 2003  sodium chloride 0.9 % flush 10 mL  As Needed,   Status:  Discontinued         10/13/24 2003    10/13/24 2003  lidocaine (XYLOCAINE) 1 % injection 0.5 mL  Once As Needed,   Status:  Discontinued         10/13/24 2003    10/13/24 2003  methylergonovine (METHERGINE) injection 200 mcg  Once As Needed,   Status:  Discontinued         10/13/24 2003    10/13/24 2003  carboprost (HEMABATE) injection 250 mcg  Every 15 Minutes PRN,   Status:  Discontinued         10/13/24 2003    10/13/24 2003  miSOPROStol (CYTOTEC) tablet 800 mcg  Once As Needed,   Status:  Discontinued         10/13/24 2003    10/13/24 2003  tranexamic acid 1000 mg in 100 mL 0.7% NaCl infusion (premix)  Once As Needed,   Status:  Discontinued         10/13/24 2003    10/13/24 1921  Urinalysis With Microscopic If Indicated (No Culture) - Urine, Clean Catch  Once         10/13/24 1922    10/13/24 1920  Vital Signs  Per Hospital Policy        Comments: Repeat blood pressure every 30 minutes, until specified.    10/13/24 1922    " 10/13/24 1920  Fetal Nonstress Test  Once        Comments: For any patient >= 24 wks gestation.    10/13/24 1922    10/13/24 1920  Pulse Oximetry, Spot  Once         10/13/24 1922    10/13/24 1920  Continuous Uterine Monitoring - For Gestational Age >24 weeks  Once         10/13/24 1922    10/13/24 1920  NPO Diet NPO Type: Strict NPO  Diet Effective Now,   Status:  Canceled         10/13/24 1922    10/13/24 1920  Vaginal Exam  Once        Comments: Perform unless patient has vaginal bleeding without known placental site, known placental previa, <36 weeks with no abdominal , or complain of ROM and <36 weeks    10/13/24 1922    10/13/24 1920  POC PH Fluid (Nitrazine)  Once        Comments: If patient is presenting with possible ROM or leaking      10/13/24 1922    Unscheduled  Apply Ice to Perineum  As Needed      Comments: For 20 min q 2 hrs    10/14/24 0517    Unscheduled  Waffle Cushion  As Needed      Comments: For perineal discomfort    10/14/24 0517    Unscheduled  Donut Ring  As Needed      Comments: For perineal pain    10/14/24 0517    Unscheduled  Kpad  As Needed      Comments: For pain    10/14/24 0517    Unscheduled  Apply witch hazel pads / TUCKS to perineum as needed for comfort PRN  As Needed       10/14/24 0517    Unscheduled  Warm compress  As Needed       10/14/24 0517    Unscheduled  Apply ace wrap, tight bra, or binder  As Needed       10/14/24 0517    Unscheduled  Apply ice packs  As Needed       10/14/24 0517    Signed and Held  Nurse may remove epidural catheter after delivery.  Until Discontinued         Signed and Held    Signed and Held  Notify physician for the following conditions:  Until Discontinued         Signed and Held    Signed and Held  Sod Citrate-Citric Acid (BICITRA) oral solution 30 mL  Once         Signed and Held    Signed and Held  ondansetron (ZOFRAN) injection 4 mg  Once As Needed         Signed and Held    Signed and Held  famotidine (PEPCID) injection 20 mg  Once As  Needed         Signed and Held    Signed and Held  lactated ringers bolus 1,000 mL  Once         Signed and Held    Signed and Held  Nurse May Remove Epidural Catheter After Delivery  Continuous         Signed and Held    Signed and Held  Transfer to Postpartum When Criteria Met  Continuous         Signed and Held    Pending  Code Status and Medical Interventions: CPR (Attempt to Resuscitate); Full  Continuous         Pending    Pending  Vital Signs Per hospital policy  Per Hospital Policy         Pending    Pending  Notify Physician  Until Discontinued         Pending    Pending  Up Ad Adriana  Until Discontinued         Pending    Pending  Ambulate Patient  Every Shift       Pending    Pending  Patient May Shower  Once         Pending    Pending  Diet: Regular/House; Fluid Consistency: Thin (IDDSI 0)  Diet Effective Now         Pending    Pending  Advance Diet As Tolerated -Regular  Until Discontinued         Pending    Pending  Fundal and Lochia Check  Per Hospital Policy        Comments: Q 15 min x 4, Q 30 min x 2, then Q Shift    Pending    Pending  RN to Assess Rh Status & Place RhIG Evaluation Order if Indicated  Continuous         Pending    Pending  Bladder Assessment  Per Order Details        Comments: Postpartum 1) Upon Admission to Unit & Every 4 Hours PRN Until Voiding. 2) Out of Bed to Void in 8 Hours.    Pending    Pending  Straight Cath  Per Order Details        Comments: Postpartum: If Distended & Unable to Void, May Repeat Once.    Pending    Pending  Indwelling Urinary Catheter  Per Order Details        Comments: Postpartum : After Straight Cathed x2 or if Greater Than 1000mL Residual, Insert Indwelling Urinary Catheter Until Further MD Order.    Pending    Pending  Apply Ice to Perineum  As Needed        Comments: For 20 min q 2 hrs    Pending    Pending  Waffle Cushion  As Needed        Comments: For perineal discomfort    Pending    Pending  Donut Ring  As Needed        Comments: For perineal pain     Pending    Pending  Sitz Bath  3 Times Daily        Comments: PRN    Pending    Pending  Kpad  As Needed        Comments: For pain    Pending    Pending  Apply witch hazel pads / TUCKS to perineum as needed for comfort PRN  As Needed         Pending    Pending  Warm compress  As Needed         Pending    Pending  Breast pump to bed  Once         Pending    Pending  Apply ace wrap, tight bra, or binder  As Needed         Pending    Pending  Apply ice packs  As Needed         Pending    Pending  If indicated -- Please administer RH Immunoglobulin based on results of cord blood evaluation and fetal screen lab tests, pharmacy to dispense  Per Order Details        Comments: See Process Instructions For Reference Range Details.    Pending    Pending  CBC & Differential  Morning Draw        Comments: Lab to draw      Pending    Pending  oxytocin (PITOCIN) 30 units in 0.9% sodium chloride 500 mL (premix)  Continuous PRN         Pending    Pending  ibuprofen (ADVIL,MOTRIN) tablet 600 mg  Every 6 Hours PRN         Pending    Pending  acetaminophen (TYLENOL) tablet 650 mg  Every 6 Hours PRN         Pending    Pending  traMADol (ULTRAM) tablet 50 mg  Every 6 Hours PRN         Pending    Pending  ketorolac (TORADOL) injection 15 mg  Every 6 Hours PRN         Pending    Pending  miSOPROStol (CYTOTEC) tablet 600 mcg  Once As Needed         Pending    Pending  methylergonovine (METHERGINE) injection 200 mcg  Once As Needed         Pending    Pending  hydrOXYzine (ATARAX) tablet 50 mg  Nightly PRN         Pending    Pending  bisacodyl (DULCOLAX) suppository 10 mg  Daily PRN         Pending    Pending  magnesium hydroxide (MILK OF MAGNESIA) suspension 10 mL  Daily PRN         Pending    Pending  docusate sodium (COLACE) capsule 100 mg  2 Times Daily         Pending    Pending  benzocaine-menthol (DERMOPLAST) 20-0.5 % topical spray  As Needed         Pending    Pending  Hydrocort-Pramoxine (Perianal) (PROCTOFOAM-HS) 1-1 % rectal  "foam 1 Application  As Needed         Pending    Pending  Hydrocortisone (Perianal) (ANUSOL-HC) 2.5 % rectal cream  As Needed         Pending    Pending  benzocaine (AMERICAINE) 20 % rectal ointment  As Needed         Pending    Pending  ondansetron ODT (ZOFRAN-ODT) disintegrating tablet 4 mg  Every 8 Hours PRN         Pending    Pending  ondansetron (ZOFRAN) injection 4 mg  Every 6 Hours PRN         Pending    Pending  promethazine (PHENERGAN) tablet 25 mg  Every 6 Hours PRN        Placed in \"Or\" Linked Group    Pending    Pending  promethazine (PHENERGAN) suppository 12.5 mg  Every 6 Hours PRN        Placed in \"Or\" Linked Group    Pending    Pending  calcium carbonate (TUMS) chewable tablet 500 mg (200 mg elemental)  3 Times Daily PRN         Pending    Pending  VTE Prophylaxis Not Indicated: Low Risk; No Risk Factors (0)  Once         Pending                     Operative/Procedure Notes (last 72 hours)        Ollie Tong MD at 10/13/24 2228          [facility]  Vaginal Delivery Note    Patient Name: Reed Xavier  :  1990  MRN:  [id number]    DX:    Pregnancy      Labor status: Active spontaneous labor     Delivery     Delivery: Vaginal, Spontaneous    YOB: 2023   Time of Birth: 10:09 PM     Anesthesia: Epidural    Delivering clinician: Ollie Tong MD       Infant    Findings: Viable female infant    Infant observations: Weight: 3440 g (7 lb 9.3 oz)       Apgars: 9  @ 1 minute /    9  @ 5 minutes     Placenta, Cord, and Fluid    Placenta delivered  Spontaneous  at  10/13/2024 10:11 PM    Cord: 3 vessels present.   Cord blood obtained: Yes         Repair    Episiotomy: No   Lacerations: 2nd degree   Estimated Blood Loss:  Quantitative Blood Loss:    mls.  Quantitative Blood Loss (mL): 238 mL         Delivery narrative: Reed Xavier is a 33 y.o.  at 39w0d.  Presented  after  spontaneous rupture of membranes at 6:15 PM on 10/13/2024  with meconium stained " fluid noted.  Had wanted to have elective  section due to a history of vacuum-assisted vaginal delivery with current pregnancy EFW suspected to be approximately 1 pound larger than the prior pregnancy.  While awaiting OR availability she started to contract and quickly became uncomfortable and was noted to be C/C/+1 @ 9:36 PM.  She was taken to a labor room and received epidural anesthesia.  Fetal status reassuring throughout.  She pushed through 3 contractions to have  of viable  female @ 10:09 PM over an intact perineum. ASDE. Delayed cord clamping performed and infant placed in kangaroo care.   3440 g (7 lb 9.3 oz).     Apgar :  9  @ 1 minute / 9  @ 5 minutes.  Placenta delivered spontaneously, intact with 3 vessel cord. Cervix and rectum intact.  Small second degree degree laceration repaired in usual fashion with 3-0 Monocryl suture. Mother and baby recovering good condition.       Ollie Tong MD  10/13/24  22:38 EDT    Note done at time of delivery then refreshed at a later date to include RN documentation not yet completed.        Electronically signed by Ollie Tong MD at 10/14/24 0513          Physician Progress Notes (last 72 hours)        Krystal Bell APRN at 10/14/24 0935       Attestation signed by Nneka Hernandez MD at 10/14/24 5115    I have reviewed this documentation and agree.    Nneka Hernandez MD  [facility]  10/14/24 14:55 EDT                  [facility]  Vaginal Delivery Progress Note    Patient Name: Reed Xavier  :  1990  MRN:  [id number]      Subjective   Postpartum Day 1: Vaginal Delivery of a female infant.     The patient feels well without complaints.  Her pain is well controlled.  Reports normal lochia.     The patient plans to breastfeed.    Objective     Vital Signs Range for the last 24 hours  Temperature: Temp:  [97.9 °F (36.6 °C)-98.6 °F (37 °C)] 98.3 °F (36.8 °C)       BP: BP: (123-164)/(71-98) 133/90   Pulse:  Heart Rate:  [] 73   Respirations: Resp:  [16-20] 16                       Physical Exam:  General: Awake and alert  Abdomen: Fundus: firm, non tender, 2fb below umbilicus  Extremities:  trace edema, NT     Labs:     Results from last 7 days   Lab Units 10/14/24  0630 10/13/24  2030   WBC 10*3/mm3 13.04* 8.81   HEMOGLOBIN g/dL 9.8* 9.9*   HEMATOCRIT % 30.1* 30.2*   PLATELETS 10*3/mm3 237 265       Prenatal labs results reviewed:  Yes   Rubella:  immune  Rh Status:    RH type   Date Value Ref Range Status   10/13/2024 Positive  Final         Assessment & Plan  :     1. PPD1 S/P : Doing well, continue usual cares.     2. Hypothyroid: continue levothyroxine 75mg; plan to check TSH at 6wk PP    3. Postpartum Anemia: hgb 9.8; PO iron at dc    4. Elevated BP: 150/96 this AM with repeat 133/90; if still elevated throughout the day, will add medication          Pregnancy          BRANDON Grady  10/14/2024  09:35 EDT     Electronically signed by Nneka Hernandez MD at 10/14/24 1455       Consult Notes (last 72 hours)  Notes from 10/11/24 2129 through 10/14/24 2129   No notes of this type exist for this encounter.

## 2024-10-15 NOTE — PLAN OF CARE
Goal Outcome Evaluation:      VSS, assessment WNL, voiding spontaneously, ambulating independently, pain controlled w/ motrin, breast feeding

## 2024-10-15 NOTE — LACTATION NOTE
Lactation Consult Note  Mom asked for help with BF. Baby has been using a pacifier and per mom now is refusing to BF. Assisted mom with waking up baby and attempting to latch her on the left breast in a cross cradle position. Educated PT on starting nipple to nose to achieve deep latch, but baby was not able to do it. From the pacifier she has a nipple confusion and when doesn't feel the long firm nipple in his mouth is pulling away and getting frustrated. After working for 10  minutes with mom and baby -NS 24 mm given with instructions of placement and use. Baby still was not able to latch, so drops of colostrum instilled into the NS and when baby felt the flow she latched easily. Baby is latching well, has a good jaw rotation and audible swallows. Educated mom on different ways to keep baby awake during BF.  Educated her also on the importance of deep latch, hand expression, how to know if infant is getting enough and burping baby between breasts. PT is going home today.  Informed her about the [facility] [place] info on the back of the edicational booklet. Discussed engourgement, pumping, milk storage and when to expect mature milk. PT denieis any questions.     Evaluation Completed: 10/15/2024 10:22 EDT  Patient Name: Reed Xavier  :  1990  MRN:  [id number]     REFERRAL  INFORMATION:                          Date of Referral: 10/15/24   Person Making Referral: patient, lactation consultant  Maternal Reason for Referral: breastfeeding currently, latch difficulty  Infant Reason for Referral: other (see comments) (nipple confusion)    DELIVERY HISTORY:        Skin to skin initiation date/time: 10/13/2024 10:15 PM  Skin to skin end date/time: 10/14/2024 12:14 AM       MATERNAL ASSESSMENT:  Breast Size Issue: none (10/15/24 0950)  Breast Shape: Bilateral:, round (10/15/24 0950)  Breast Density: Bilateral:, soft (10/15/24 0950)  Areola: Bilateral:, elastic (10/15/24 0950)  Nipples: Bilateral:, everted, graspable  (10/15/24 0950)                INFANT ASSESSMENT:  Information for the patient's :  Monica Xavier [[id number]]   No past medical history on file.  Feeding Readiness Cues: eager, rooting (10/15/24 0950)                    Feeding Interventions: jaw supported, latch assistance provided, lips stroked, sucking promoted (10/15/24 0950)              Breastfeeding: breastfeeding, left side only (10/15/24 0950)  Infant Positioning: cross-cradle, clutch/football (10/15/24 0950)        Effective Latch During Feeding: yes (10/15/24 0950)  Suck/Swallow/Breathing Coordination: present (10/15/24 0950)  Signs of Milk Transfer: audible swallow (10/15/24 0950)      Latch: 1-->repeated attempts, holds nipple in mouth, stimulate to suck (10/15/24 0950)  Audible Swallowin-->spontaneous and intermittent (24 hrs old) (10/15/24 0950)  Type of Nipple: 2-->everted (after stimulation) (10/15/24 0950)  Comfort (Breast/Nipple): 2-->soft/nontender (10/15/24 0950)  Hold (Positioning): 0-->full assist (staff holds infant at breast) (10/15/24 0950)  Latch Score: 7 (10/15/24 0950)                   MATERNAL INFANT FEEDING:     Maternal Emotional State: receptive, relaxed (10/15/24 0950)  Infant Positioning: clutch/football, cross-cradle (10/15/24 0950)   Signs of Milk Transfer: audible swallow (10/15/24 0950)  Pain with Feeding: no (10/15/24 0950)           Milk Ejection Reflex: present (10/15/24 0950)           Latch Assistance: full assistance needed (10/15/24 0950)                               EQUIPMENT TYPE:                                 BREAST PUMPING:          LACTATION REFERRALS:

## 2024-10-24 ENCOUNTER — MATERNAL SCREENING (OUTPATIENT)
Dept: CALL CENTER | Facility: HOSPITAL | Age: 34
End: 2024-10-24
Payer: COMMERCIAL

## 2024-10-24 NOTE — OUTREACH NOTE
Maternal Screening Survey      Flowsheet Row Responses   Facility patient discharged from? [facility]   Attempt successful? No   Unsuccessful attempts Attempt 1              ADELE HARDWICK - Registered Nurse

## 2024-10-24 NOTE — OUTREACH NOTE
Maternal Screening Survey      Flowsheet Row Responses   Facility patient discharged from? [facility]   Attempt successful? No   Unsuccessful attempts Attempt 2              ADELE HARDWICK - Registered Nurse

## 2024-10-25 ENCOUNTER — MATERNAL SCREENING (OUTPATIENT)
Dept: CALL CENTER | Facility: HOSPITAL | Age: 34
End: 2024-10-25
Payer: COMMERCIAL

## 2024-10-25 NOTE — OUTREACH NOTE
Maternal Screening Survey      Flowsheet Row Responses   Facility patient discharged from? [facility]   Attempt successful? No   Unsuccessful attempts Attempt 3   Revoke Decline to participate              Vasu LAMAR - Registered Nurse

## 2025-05-26 ENCOUNTER — PATIENT MESSAGE (OUTPATIENT)
Dept: FAMILY MEDICINE CLINIC | Facility: CLINIC | Age: 35
End: 2025-05-26
Payer: COMMERCIAL

## 2025-05-26 DIAGNOSIS — F41.9 ANXIETY: Primary | ICD-10-CM

## 2025-05-27 RX ORDER — FLUVOXAMINE MALEATE 25 MG
25 TABLET ORAL NIGHTLY
Qty: 30 TABLET | Refills: 1 | Status: SHIPPED | OUTPATIENT
Start: 2025-05-27

## 2025-06-16 ENCOUNTER — PATIENT MESSAGE (OUTPATIENT)
Dept: FAMILY MEDICINE CLINIC | Facility: CLINIC | Age: 35
End: 2025-06-16
Payer: COMMERCIAL

## 2025-06-16 DIAGNOSIS — F41.9 ANXIETY: ICD-10-CM

## 2025-06-16 RX ORDER — FLUVOXAMINE MALEATE 50 MG
50 TABLET ORAL NIGHTLY
Qty: 90 TABLET | Refills: 3 | Status: SHIPPED | OUTPATIENT
Start: 2025-06-16

## 2025-06-24 ENCOUNTER — OFFICE VISIT (OUTPATIENT)
Dept: FAMILY MEDICINE CLINIC | Facility: CLINIC | Age: 35
End: 2025-06-24
Payer: COMMERCIAL

## 2025-06-24 VITALS
HEIGHT: 66 IN | SYSTOLIC BLOOD PRESSURE: 124 MMHG | DIASTOLIC BLOOD PRESSURE: 82 MMHG | HEART RATE: 94 BPM | BODY MASS INDEX: 27.58 KG/M2 | OXYGEN SATURATION: 98 % | WEIGHT: 171.6 LBS

## 2025-06-24 DIAGNOSIS — R73.9 HYPERGLYCEMIA: Primary | ICD-10-CM

## 2025-06-24 DIAGNOSIS — E03.8 OTHER SPECIFIED HYPOTHYROIDISM: ICD-10-CM

## 2025-06-24 DIAGNOSIS — E66.3 OVERWEIGHT (BMI 25.0-29.9): ICD-10-CM

## 2025-06-24 DIAGNOSIS — Z82.49 FAMILY HISTORY OF CARDIOVASCULAR DISEASE: ICD-10-CM

## 2025-06-24 PROCEDURE — 99214 OFFICE O/P EST MOD 30 MIN: CPT | Performed by: FAMILY MEDICINE

## 2025-06-24 NOTE — PATIENT INSTRUCTIONS
Today: Labs    Weight loss diet/lifestyle:   - Reduce intake of processed food (shop perimeter of grocery store), especially white flour and sugar  - Carbohydrates 150-200g/day  - Increase intake of fruits/veggies  - Drink 32-64oz of water a day  - Get 6-8 hours of restful sleep at night- poor sleep quality has been linked to increased risk of cardiovascular disease  - Voluntary physical activity cumulative 120-150 minutes/week  - Stress management utilizing meditation and mindfulness (InsightTimer, free ivette)  - Keep blood pressure < 140/90    Heart healthy diet from AHA: https://www.heart.org/en/healthy-living/healthy-eating/eat-smart/nutrition-basics/aha-[email or web address]-lifestyle-recommendations

## 2025-06-24 NOTE — PROGRESS NOTES
Answers submitted by the patient for this visit:  Weight Management (Submitted on 6/23/2025)  Chief Complaint: Weight Management  Weight: increased  Weight change (lbs): 40  Weight loss treatment: low calorie, low carb diet, portion control, increasing exercise  Eating habit changes: No changes  Energy level: unchanged  Physical activity tolerance: improved  Additional information: Postpartum and breastfeeding    Chief Complaint  Chief Complaint   Patient presents with    Weight Loss    Postpartum Care       Subjective    History of Present Illness  Reed Xavier is a 34 y.o. female presents to [facility] for followup    History of Present Illness  The patient presents to discuss weight.    She is currently focusing on weight loss, having gained approximately 30 to 40 pounds during her pregnancies. She has not returned to her pre-pregnancy weight and is currently nursing her 8-month-old child. She aims to breastfeed for a year. Regular exercise, including strength training and pickleball, has been part of her routine, and she has recently eliminated sugar from her diet. She has not participated in any weight loss programs such as Weight Watchers or SlimFast but has been managing her sugar intake and portion control. Exercise is maintained for at least 150 minutes per week. She has never taken metformin or other weight loss meds.     She reports no history of sleep apnea, high blood pressure, heart disease, or diabetes. During pregnancy, she experienced an abnormal 1-hour blood sugar test, but 8-hour GTT was normal. Acid reflux was present during pregnancy, and she reports occasional urinary leakage. Hypothyroidism is managed with medication, and she is currently taking Luvox, which she finds beneficial. Previously, Lexapro was used for PPD, which she discontinued around the same time she stopped breastfeeding without any issues.    FAMILY HISTORY  She reports no family history of  "thyroid cancer. Her family has a history of leukemia and breast cancer.    Objective   Vitals:    06/24/25 1120   BP: 124/82   Pulse: 94   SpO2: 98%   Weight: 77.8 kg (171 lb 9.6 oz)   Height: 167.6 cm (65.98\")        BMI is >= 25 and <30. (Overweight) The following options were offered after discussion;: weight loss educational material (shared in after visit summary), exercise counseling/recommendations, nutrition counseling/recommendations, and information on healthy weight added to patient's after visit summary        Physical Exam  Vitals reviewed.   Constitutional:       Appearance: Normal appearance.   HENT:      Head: Normocephalic and atraumatic.   Cardiovascular:      Comments: Well perfused  Pulmonary:      Effort: Pulmonary effort is normal. No respiratory distress.   Abdominal:      General: There is no distension.   Musculoskeletal:         General: Normal range of motion.   Neurological:      Mental Status: She is alert. Mental status is at baseline.          The following data was reviewed by: Kelli Faria MD on 06/24/2025:  2024 TSH, CBC, CMP, A1c, lipid    Assessment and Plan  Reed Xavier is a 34 y.o. female presents to [facility] today for followup   Assessment & Plan  1. Weight management.  - Current BMI is 27, categorized as overweight. Blood pressure is 124/82, within normal range.  - Comprehensive lab workup ordered today to assess blood counts, liver function, kidney function, cholesterol levels, thyroid function, vitamin D levels, A1c, B12, and iron levels.  - Discussed dietary strategies including reducing processed foods, particularly those containing white flour and sugar, and increasing fruits and vegetables. Recommended adequate hydration, restful sleep, and regular exercise (120 to 150 minutes per week). Suggested stress management techniques such as meditation.  - Weight loss medications are contraindicated during breastfeeding.  Patient's spouse " has had vasectomy.  No contraindications to GLP-1 if desired after weaning.  May consider Contrave if insurance does not cover GLP-1 medications.    Follow-up  The patient will follow up in November 2025, labs 1 week before appt.    Diagnoses and all orders for this visit:    1. Hyperglycemia (Primary)  -     Comprehensive Metabolic Panel  -     Hemoglobin A1c  -     Comprehensive Metabolic Panel; Future  -     Hemoglobin A1c; Future    2. Other specified hypothyroidism  Overview:  On synthroid    Orders:  -     TSH Rfx On Abnormal To Free T4    3. Family history of cardiovascular disease  -     Lipid Panel  -     Hemoglobin A1c    4. Overweight (BMI 25.0-29.9)  Overview:  Obesity pharmacotherapy recommended in conjunction with continued diet and lifestyle change, including reduced calorie diet and exercise 120-150 minutes weekly.     Co morbidities: impaired fasting glucose and urinary stress incontinence  BMI prior to starting weight loss medication:     BMI Readings from Last 3 Encounters:   06/24/25 27.71 kg/m²   10/13/24 34.67 kg/m²   10/06/24 33.73 kg/m²     Dietary strategies tried for at least 6 mo without improvement: stress management, reducing sugar, portion control  Exercises 120-150 min/week for at least 6 mo without improvement: Yes  Previous weight loss pharmacotherapy: none  Medications that may contribute to weight gain have been stopped: No        Orders:  -     CBC & Differential  -     Comprehensive Metabolic Panel  -     Lipid Panel  -     TSH Rfx On Abnormal To Free T4  -     Vitamin D,25-Hydroxy  -     Hemoglobin A1c  -     Vitamin B12  -     Iron  -     Ferritin  -     Comprehensive Metabolic Panel; Future  -     Lipid Panel; Future  -     Hemoglobin A1c; Future        Patient voiced understanding and agreement with plan of care and had no further questions or concerns at this time.     Problems addressed:  established problem: inadequately controlled,  Complexity: labs ordered yes, labs  reviewed yes  Risk: prescription drug management-contraindicated due to breast-feeding at this time    Kelli Faria MD  Family Medicine  [facility]      Follow Up  Return labs 1 week before appt- ordered, for Next scheduled follow up.    Patient Instructions   Today: Labs    Weight loss diet/lifestyle:   - Reduce intake of processed food (shop perimeter of grocery store), especially white flour and sugar  - Carbohydrates 150-200g/day  - Increase intake of fruits/veggies  - Drink 32-64oz of water a day  - Get 6-8 hours of restful sleep at night- poor sleep quality has been linked to increased risk of cardiovascular disease  - Voluntary physical activity cumulative 120-150 minutes/week  - Stress management utilizing meditation and mindfulness (VivaRayTimer, free ivette)  - Keep blood pressure < 140/90    Heart healthy diet from AHA: https://www.heart.org/en/healthy-living/healthy-eating/eat-smart/nutrition-basics/aha-diet-and-lifestyle-recommendations       Patient or patient representative verbalized consent for the use of Ambient Listening during the visit with  Kelli Faria MD for chart documentation. 6/24/2025  14:50 EDT

## 2025-06-25 LAB
25(OH)D3+25(OH)D2 SERPL-MCNC: 27.4 NG/ML (ref 30–100)
ALBUMIN SERPL-MCNC: 4.6 G/DL (ref 3.5–5.2)
ALBUMIN/GLOB SERPL: 1.4 G/DL
ALP SERPL-CCNC: 111 U/L (ref 39–117)
ALT SERPL-CCNC: 18 U/L (ref 1–33)
AST SERPL-CCNC: 21 U/L (ref 1–32)
BASOPHILS # BLD AUTO: 0.02 10*3/MM3 (ref 0–0.2)
BASOPHILS NFR BLD AUTO: 0.3 % (ref 0–1.5)
BILIRUB SERPL-MCNC: 0.2 MG/DL (ref 0–1.2)
BUN SERPL-MCNC: 16 MG/DL (ref 6–20)
BUN/CREAT SERPL: 19.8 (ref 7–25)
CALCIUM SERPL-MCNC: 9.8 MG/DL (ref 8.6–10.5)
CHLORIDE SERPL-SCNC: 104 MMOL/L (ref 98–107)
CHOLEST SERPL-MCNC: 176 MG/DL (ref 0–200)
CO2 SERPL-SCNC: 24.1 MMOL/L (ref 22–29)
CREAT SERPL-MCNC: 0.81 MG/DL (ref 0.57–1)
EGFRCR SERPLBLD CKD-EPI 2021: 98.4 ML/MIN/1.73
EOSINOPHIL # BLD AUTO: 0.09 10*3/MM3 (ref 0–0.4)
EOSINOPHIL NFR BLD AUTO: 1.3 % (ref 0.3–6.2)
ERYTHROCYTE [DISTWIDTH] IN BLOOD BY AUTOMATED COUNT: 12.1 % (ref 12.3–15.4)
FERRITIN SERPL-MCNC: 32.4 NG/ML (ref 13–150)
GLOBULIN SER CALC-MCNC: 3.4 GM/DL
GLUCOSE SERPL-MCNC: 84 MG/DL (ref 65–99)
HBA1C MFR BLD: 5.4 % (ref 4.8–5.6)
HCT VFR BLD AUTO: 41.5 % (ref 34–46.6)
HDLC SERPL-MCNC: 68 MG/DL (ref 40–60)
HGB BLD-MCNC: 13.8 G/DL (ref 12–15.9)
IMM GRANULOCYTES # BLD AUTO: 0.02 10*3/MM3 (ref 0–0.05)
IMM GRANULOCYTES NFR BLD AUTO: 0.3 % (ref 0–0.5)
IRON SERPL-MCNC: 98 MCG/DL (ref 37–145)
LDLC SERPL CALC-MCNC: 94 MG/DL (ref 0–100)
LYMPHOCYTES # BLD AUTO: 2 10*3/MM3 (ref 0.7–3.1)
LYMPHOCYTES NFR BLD AUTO: 28.5 % (ref 19.6–45.3)
MCH RBC QN AUTO: 30.9 PG (ref 26.6–33)
MCHC RBC AUTO-ENTMCNC: 33.3 G/DL (ref 31.5–35.7)
MCV RBC AUTO: 92.8 FL (ref 79–97)
MONOCYTES # BLD AUTO: 0.45 10*3/MM3 (ref 0.1–0.9)
MONOCYTES NFR BLD AUTO: 6.4 % (ref 5–12)
NEUTROPHILS # BLD AUTO: 4.44 10*3/MM3 (ref 1.7–7)
NEUTROPHILS NFR BLD AUTO: 63.2 % (ref 42.7–76)
NRBC BLD AUTO-RTO: 0 /100 WBC (ref 0–0.2)
PLATELET # BLD AUTO: 299 10*3/MM3 (ref 140–450)
POTASSIUM SERPL-SCNC: 4.8 MMOL/L (ref 3.5–5.2)
PROT SERPL-MCNC: 8 G/DL (ref 6–8.5)
RBC # BLD AUTO: 4.47 10*6/MM3 (ref 3.77–5.28)
SODIUM SERPL-SCNC: 139 MMOL/L (ref 136–145)
TRIGL SERPL-MCNC: 73 MG/DL (ref 0–150)
TSH SERPL DL<=0.005 MIU/L-ACNC: 3.65 UIU/ML (ref 0.27–4.2)
VIT B12 SERPL-MCNC: 728 PG/ML (ref 211–946)
VLDLC SERPL CALC-MCNC: 14 MG/DL (ref 5–40)
WBC # BLD AUTO: 7.02 10*3/MM3 (ref 3.4–10.8)